# Patient Record
Sex: FEMALE | Race: OTHER | HISPANIC OR LATINO
[De-identification: names, ages, dates, MRNs, and addresses within clinical notes are randomized per-mention and may not be internally consistent; named-entity substitution may affect disease eponyms.]

---

## 2017-11-09 PROBLEM — Z00.00 ENCOUNTER FOR PREVENTIVE HEALTH EXAMINATION: Status: ACTIVE | Noted: 2017-11-09

## 2017-12-04 ENCOUNTER — APPOINTMENT (OUTPATIENT)
Dept: OBGYN | Facility: CLINIC | Age: 22
End: 2017-12-04
Payer: COMMERCIAL

## 2017-12-04 VITALS — WEIGHT: 293 LBS | BODY MASS INDEX: 50.02 KG/M2 | HEIGHT: 64 IN

## 2017-12-04 DIAGNOSIS — Z01.419 ENCOUNTER FOR GYNECOLOGICAL EXAMINATION (GENERAL) (ROUTINE) W/OUT ABNORMAL FINDINGS: ICD-10-CM

## 2017-12-04 PROCEDURE — 99385 PREV VISIT NEW AGE 18-39: CPT

## 2017-12-05 LAB
C TRACH RRNA SPEC QL NAA+PROBE: NOT DETECTED
FSH SERPL-MCNC: 8.4 IU/L
HCG SERPL-MCNC: <1 MIU/ML
LH SERPL-ACNC: 13.3 IU/L
N GONORRHOEA RRNA SPEC QL NAA+PROBE: NOT DETECTED
PROLACTIN SERPL-MCNC: 23 NG/ML
SOURCE TP AMPLIFICATION: NORMAL
TSH SERPL-ACNC: 7.2 UIU/ML

## 2017-12-08 LAB — CYTOLOGY CVX/VAG DOC THIN PREP: NORMAL

## 2017-12-14 ENCOUNTER — APPOINTMENT (OUTPATIENT)
Dept: ANTEPARTUM | Facility: CLINIC | Age: 22
End: 2017-12-14
Payer: COMMERCIAL

## 2017-12-14 ENCOUNTER — ASOB RESULT (OUTPATIENT)
Age: 22
End: 2017-12-14

## 2017-12-14 PROCEDURE — 76830 TRANSVAGINAL US NON-OB: CPT

## 2017-12-14 PROCEDURE — 76857 US EXAM PELVIC LIMITED: CPT

## 2018-02-01 ENCOUNTER — APPOINTMENT (OUTPATIENT)
Dept: GASTROENTEROLOGY | Facility: CLINIC | Age: 23
End: 2018-02-01
Payer: COMMERCIAL

## 2018-02-01 VITALS
WEIGHT: 293 LBS | DIASTOLIC BLOOD PRESSURE: 76 MMHG | RESPIRATION RATE: 18 BRPM | SYSTOLIC BLOOD PRESSURE: 111 MMHG | HEIGHT: 64 IN | BODY MASS INDEX: 50.02 KG/M2 | HEART RATE: 99 BPM

## 2018-02-01 DIAGNOSIS — Z86.39 PERSONAL HISTORY OF OTHER ENDOCRINE, NUTRITIONAL AND METABOLIC DISEASE: ICD-10-CM

## 2018-02-01 DIAGNOSIS — R73.03 PREDIABETES.: ICD-10-CM

## 2018-02-01 DIAGNOSIS — Z91.89 OTHER SPECIFIED PERSONAL RISK FACTORS, NOT ELSEWHERE CLASSIFIED: ICD-10-CM

## 2018-02-01 PROCEDURE — 99204 OFFICE O/P NEW MOD 45 MIN: CPT

## 2018-02-01 RX ORDER — METFORMIN ER 500 MG 500 MG/1
500 TABLET ORAL
Qty: 90 | Refills: 0 | Status: ACTIVE | COMMUNITY
Start: 2018-01-11

## 2018-02-01 RX ORDER — LEVOTHYROXINE SODIUM 0.05 MG/1
50 TABLET ORAL
Qty: 30 | Refills: 0 | Status: ACTIVE | COMMUNITY
Start: 2018-01-11

## 2018-02-27 ENCOUNTER — LABORATORY RESULT (OUTPATIENT)
Age: 23
End: 2018-02-27

## 2018-02-28 LAB
ALBUMIN SERPL ELPH-MCNC: 4.2 G/DL
ALP BLD-CCNC: 88 U/L
ALT SERPL-CCNC: 15 U/L
ANION GAP SERPL CALC-SCNC: 11 MMOL/L
AST SERPL-CCNC: 15 U/L
BASOPHILS # BLD AUTO: 0.02 K/UL
BASOPHILS NFR BLD AUTO: 0.2 %
BILIRUB SERPL-MCNC: 0.2 MG/DL
BUN SERPL-MCNC: 10 MG/DL
CALCIUM SERPL-MCNC: 9.3 MG/DL
CHLORIDE SERPL-SCNC: 102 MMOL/L
CO2 SERPL-SCNC: 26 MMOL/L
CREAT SERPL-MCNC: 0.74 MG/DL
EOSINOPHIL # BLD AUTO: 0 K/UL
EOSINOPHIL NFR BLD AUTO: 0 %
GLUCOSE SERPL-MCNC: 99 MG/DL
HCT VFR BLD CALC: 34 %
HGB BLD-MCNC: 10.7 G/DL
IMM GRANULOCYTES NFR BLD AUTO: 0.2 %
INR PPP: 1.16 RATIO
LYMPHOCYTES # BLD AUTO: 2.73 K/UL
LYMPHOCYTES NFR BLD AUTO: 29.1 %
MAN DIFF?: NORMAL
MCHC RBC-ENTMCNC: 24.9 PG
MCHC RBC-ENTMCNC: 31.5 GM/DL
MCV RBC AUTO: 79.3 FL
MONOCYTES # BLD AUTO: 0.76 K/UL
MONOCYTES NFR BLD AUTO: 8.1 %
NEUTROPHILS # BLD AUTO: 5.85 K/UL
NEUTROPHILS NFR BLD AUTO: 62.4 %
PLATELET # BLD AUTO: 297 K/UL
POTASSIUM SERPL-SCNC: 4.3 MMOL/L
PROT SERPL-MCNC: 7.4 G/DL
PT BLD: 13.2 SEC
RBC # BLD: 4.29 M/UL
RBC # FLD: 17.1 %
SODIUM SERPL-SCNC: 139 MMOL/L
TSH SERPL-ACNC: 4.29 UIU/ML
WBC # FLD AUTO: 9.38 K/UL

## 2018-03-07 ENCOUNTER — APPOINTMENT (OUTPATIENT)
Dept: GASTROENTEROLOGY | Facility: HOSPITAL | Age: 23
End: 2018-03-07

## 2018-03-07 ENCOUNTER — RESULT REVIEW (OUTPATIENT)
Age: 23
End: 2018-03-07

## 2018-03-07 ENCOUNTER — OUTPATIENT (OUTPATIENT)
Dept: OUTPATIENT SERVICES | Facility: HOSPITAL | Age: 23
LOS: 1 days | End: 2018-03-07
Payer: COMMERCIAL

## 2018-03-07 ENCOUNTER — TRANSCRIPTION ENCOUNTER (OUTPATIENT)
Age: 23
End: 2018-03-07

## 2018-03-07 DIAGNOSIS — E66.01 MORBID (SEVERE) OBESITY DUE TO EXCESS CALORIES: ICD-10-CM

## 2018-03-07 LAB
GLUCOSE BLDC GLUCOMTR-MCNC: 102 MG/DL — HIGH (ref 70–99)
HCG UR QL: NEGATIVE — SIGNIFICANT CHANGE UP

## 2018-03-07 PROCEDURE — 81025 URINE PREGNANCY TEST: CPT

## 2018-03-07 PROCEDURE — 43239 EGD BIOPSY SINGLE/MULTIPLE: CPT

## 2018-03-07 PROCEDURE — 88342 IMHCHEM/IMCYTCHM 1ST ANTB: CPT

## 2018-03-07 PROCEDURE — 88305 TISSUE EXAM BY PATHOLOGIST: CPT

## 2018-03-07 PROCEDURE — 88342 IMHCHEM/IMCYTCHM 1ST ANTB: CPT | Mod: 26

## 2018-03-07 PROCEDURE — 88305 TISSUE EXAM BY PATHOLOGIST: CPT | Mod: 26

## 2018-03-07 PROCEDURE — 82962 GLUCOSE BLOOD TEST: CPT

## 2019-01-17 ENCOUNTER — EMERGENCY (EMERGENCY)
Facility: HOSPITAL | Age: 24
LOS: 1 days | Discharge: HOME | End: 2019-01-17
Attending: STUDENT IN AN ORGANIZED HEALTH CARE EDUCATION/TRAINING PROGRAM
Payer: COMMERCIAL

## 2019-01-17 VITALS
SYSTOLIC BLOOD PRESSURE: 151 MMHG | HEIGHT: 64 IN | HEART RATE: 100 BPM | WEIGHT: 293 LBS | RESPIRATION RATE: 20 BRPM | OXYGEN SATURATION: 100 % | TEMPERATURE: 99 F | DIASTOLIC BLOOD PRESSURE: 91 MMHG

## 2019-01-17 LAB — HCG UR QL: NEGATIVE — SIGNIFICANT CHANGE UP

## 2019-01-17 PROCEDURE — 99283 EMERGENCY DEPT VISIT LOW MDM: CPT

## 2019-01-17 PROCEDURE — 81025 URINE PREGNANCY TEST: CPT

## 2019-01-17 RX ORDER — ONDANSETRON 8 MG/1
1 TABLET, FILM COATED ORAL
Qty: 9 | Refills: 0
Start: 2019-01-17 | End: 2019-01-19

## 2019-01-17 RX ORDER — ONDANSETRON 8 MG/1
8 TABLET, FILM COATED ORAL ONCE
Qty: 0 | Refills: 0 | Status: COMPLETED | OUTPATIENT
Start: 2019-01-17 | End: 2019-01-17

## 2019-01-17 RX ADMIN — ONDANSETRON 8 MILLIGRAM(S): 8 TABLET, FILM COATED ORAL at 21:28

## 2019-01-17 NOTE — ED STATDOCS - NS ED ROS FT
+ fever, no chills, No photophobia/eye pain/changes in vision, No ear pain, + sore throat, no dysphagia, No chest pain/palpitations, no SOB/cough/wheeze/stridor, + abdominal pain, + N/V/D, no dysuria/frequency/discharge, No neck/back pain, no rash, no changes in neurological status/function. + fever, no chills, No photophobia/eye pain/changes in vision, No ear pain, + sore throat, no dysphagia, No chest pain/palpitations, + cough, no SOB/wheeze/stridor, + abdominal pain, + N/V/D, no dysuria/frequency/discharge, No neck/back pain, no rash, no changes in neurological status/function.

## 2019-01-17 NOTE — ED STATDOCS - PROGRESS NOTE DETAILS
Pa note: Pt with improvement I symptoms s/p treatment, no episodes of vomiting in ED. Pt tolerating PO intake. Urine preg negative. Likely viral syndrome. Will d/c with short course of PO zofran

## 2019-01-17 NOTE — ED STATDOCS - OBJECTIVE STATEMENT
22 y/o F pt presents to the ED c/o N/V/D and abdominal pain today.  Pt had episodes of diarrhea beginning 7 days ago, which resolved on its own several days later.  Pt began to have a sore throat and subjective fever 2 days ago, saw her PMD yesterday, was told she had a viral infection and told to take ibuprofen.  Today she began to have episodes of diarrhea and vomiting today, with associated abdominal pain.  Pt ate one meal today and vomited afterwards, drank a small amount of ginger ale, but no other fluids today.  Denies CP, SOB, urinary symptoms, back pain.  No further acute complaints at this time. 24 y/o F pt presents to the ED c/o N/V/D and abdominal pain today.  Pt had episodes of diarrhea beginning 7 days ago, which resolved on its own several days later.  Pt began to have a sore throat, cough, and subjective fever 2 days ago, saw her PMD yesterday, was told she had a viral infection and told to take ibuprofen.  Today she began to have episodes of diarrhea and vomiting today, and noted abdominal pain after episodes of coughing.  Pt ate one meal today and vomited afterwards, drank a small amount of ginger ale, but no other fluids today.  Denies CP, SOB, urinary symptoms, back pain.  No further acute complaints at this time.

## 2019-05-20 ENCOUNTER — APPOINTMENT (OUTPATIENT)
Dept: PULMONOLOGY | Facility: CLINIC | Age: 24
End: 2019-05-20

## 2019-07-25 ENCOUNTER — APPOINTMENT (OUTPATIENT)
Dept: PULMONOLOGY | Facility: CLINIC | Age: 24
End: 2019-07-25

## 2019-08-15 ENCOUNTER — APPOINTMENT (OUTPATIENT)
Dept: PULMONOLOGY | Facility: CLINIC | Age: 24
End: 2019-08-15

## 2019-09-02 ENCOUNTER — TRANSCRIPTION ENCOUNTER (OUTPATIENT)
Age: 24
End: 2019-09-02

## 2019-09-03 ENCOUNTER — APPOINTMENT (OUTPATIENT)
Dept: PULMONOLOGY | Facility: CLINIC | Age: 24
End: 2019-09-03
Payer: MEDICAID

## 2019-09-03 VITALS — BODY MASS INDEX: 51.27 KG/M2 | WEIGHT: 293 LBS | HEIGHT: 63.5 IN

## 2019-09-03 VITALS
HEART RATE: 83 BPM | SYSTOLIC BLOOD PRESSURE: 138 MMHG | DIASTOLIC BLOOD PRESSURE: 82 MMHG | OXYGEN SATURATION: 99 % | RESPIRATION RATE: 16 BRPM

## 2019-09-03 PROCEDURE — 94010 BREATHING CAPACITY TEST: CPT

## 2019-09-03 PROCEDURE — 85018 HEMOGLOBIN: CPT | Mod: QW

## 2019-09-03 PROCEDURE — 94729 DIFFUSING CAPACITY: CPT

## 2019-09-03 PROCEDURE — 94727 GAS DIL/WSHOT DETER LNG VOL: CPT

## 2019-09-03 PROCEDURE — 99204 OFFICE O/P NEW MOD 45 MIN: CPT | Mod: 25

## 2019-09-03 RX ORDER — CYCLOBENZAPRINE HYDROCHLORIDE 10 MG/1
10 TABLET, FILM COATED ORAL
Qty: 30 | Refills: 0 | Status: DISCONTINUED | COMMUNITY
Start: 2017-12-27 | End: 2019-09-03

## 2019-09-03 NOTE — REVIEW OF SYSTEMS
[Dyspnea] : dyspnea [Hypertension] : ~T hypertension [Diabetes] : diabetes mellitus [Negative] : Sleep Disorder

## 2019-09-03 NOTE — CONSULT LETTER
[Dear  ___] : Dear  [unfilled], [Consult Closing:] : Thank you very much for allowing me to participate in the care of this patient.  If you have any questions, please do not hesitate to contact me. [Please see my note below.] : Please see my note below. [Consult Letter:] : I had the pleasure of evaluating your patient, [unfilled]. [Sincerely,] : Sincerely, [FreeTextEntry3] : Antonio Hess MD\par

## 2019-09-03 NOTE — REASON FOR VISIT
[Preoperative Evaluation] : an preoperative evaluation [Shortness of Breath] : shortness of Breath [Sleep Apnea] : sleep apnea

## 2019-09-03 NOTE — PHYSICAL EXAM
[General Appearance - Well Developed] : well developed [Normal Appearance] : normal appearance [Well Groomed] : well groomed [General Appearance - Well Nourished] : well nourished [General Appearance - In No Acute Distress] : no acute distress [No Deformities] : no deformities [Eyelids - No Xanthelasma] : the eyelids demonstrated no xanthelasmas [Normal Conjunctiva] : the conjunctiva exhibited no abnormalities [Normal Oropharynx] : normal oropharynx [Neck Appearance] : the appearance of the neck was normal [II] : II [Neck Cervical Mass (___cm)] : no neck mass was observed [Jugular Venous Distention Increased] : there was no jugular-venous distention [Thyroid Diffuse Enlargement] : the thyroid was not enlarged [Thyroid Nodule] : there were no palpable thyroid nodules [Heart Rate And Rhythm] : heart rate and rhythm were normal [Heart Sounds] : normal S1 and S2 [Murmurs] : no murmurs present [Respiration, Rhythm And Depth] : normal respiratory rhythm and effort [Exaggerated Use Of Accessory Muscles For Inspiration] : no accessory muscle use [Auscultation Breath Sounds / Voice Sounds] : lungs were clear to auscultation bilaterally [Abdomen Soft] : soft [Abdomen Tenderness] : non-tender [Abdomen Mass (___ Cm)] : no abdominal mass palpated [Gait - Sufficient For Exercise Testing] : the gait was sufficient for exercise testing [Abnormal Walk] : normal gait [Cyanosis, Localized] : no localized cyanosis [Nail Clubbing] : no clubbing of the fingernails [Petechial Hemorrhages (___cm)] : no petechial hemorrhages [] : no ischemic changes [Skin Turgor] : normal skin turgor [No Focal Deficits] : no focal deficits [Oriented To Time, Place, And Person] : oriented to person, place, and time [Impaired Insight] : insight and judgment were intact [Affect] : the affect was normal

## 2019-09-19 ENCOUNTER — OUTPATIENT (OUTPATIENT)
Dept: OUTPATIENT SERVICES | Facility: HOSPITAL | Age: 24
LOS: 1 days | End: 2019-09-19
Payer: COMMERCIAL

## 2019-09-19 DIAGNOSIS — G47.33 OBSTRUCTIVE SLEEP APNEA (ADULT) (PEDIATRIC): ICD-10-CM

## 2019-09-19 PROCEDURE — 95806 SLEEP STUDY UNATT&RESP EFFT: CPT | Mod: 26

## 2019-09-19 PROCEDURE — 95806 SLEEP STUDY UNATT&RESP EFFT: CPT

## 2019-09-19 PROCEDURE — G0399: CPT

## 2019-12-31 ENCOUNTER — EMERGENCY (EMERGENCY)
Facility: HOSPITAL | Age: 24
LOS: 1 days | Discharge: HOME | End: 2019-12-31
Attending: EMERGENCY MEDICINE
Payer: COMMERCIAL

## 2019-12-31 VITALS
SYSTOLIC BLOOD PRESSURE: 123 MMHG | HEART RATE: 98 BPM | DIASTOLIC BLOOD PRESSURE: 71 MMHG | HEIGHT: 64 IN | OXYGEN SATURATION: 100 % | RESPIRATION RATE: 18 BRPM | TEMPERATURE: 98 F | WEIGHT: 293 LBS

## 2019-12-31 PROCEDURE — 99284 EMERGENCY DEPT VISIT MOD MDM: CPT

## 2019-12-31 PROCEDURE — 99283 EMERGENCY DEPT VISIT LOW MDM: CPT

## 2019-12-31 PROCEDURE — 93005 ELECTROCARDIOGRAM TRACING: CPT

## 2019-12-31 PROCEDURE — 93010 ELECTROCARDIOGRAM REPORT: CPT

## 2019-12-31 NOTE — ED STATDOCS - PATIENT PORTAL LINK FT
You can access the FollowMyHealth Patient Portal offered by [facility] by registering at the following website: [email or web address]. By joining [employer]’s FollowMyHealth portal, you will also be able to view your health information using other applications (apps) compatible with our system.

## 2019-12-31 NOTE — ED STATDOCS - OBJECTIVE STATEMENT
25 y/o F with a significant PMHx of DM, hypothyroidism, anemia, and HTN presents to the ED c/o heavy palpitations onset 1.5 hours PTA. Pt notes that she was sitting at a hair salon when she began to experience palpitations that have not resolved since; she notes that the palpitations last for about 5 minutes then go away but will return shortly after. Pt states the palpitations feel as though "her heart is going to pound out of her chest." Pt denies any associated LE edema, CP, SOB or n/v/d. Pt denies any notable cardiac history. Pt currently takes numerous HTN medications(Enalapril, amlodipine, and clonidine) as well as metformin and Levothyroxine. NKDA. No further complaints at this time.  Pt has a bariatric surgery scheduled on 01/28

## 2019-12-31 NOTE — ED STATDOCS - NS ED ATTENDING STATEMENT MOD
s/p Right breast revision of reconstruction with exchange of implants, placement of alloderm
Attending Only

## 2019-12-31 NOTE — ED ADULT TRIAGE NOTE - CHIEF COMPLAINT QUOTE
I was at the nail salon and started feeling palpitations, hx of hypothyroidism, DM, anemia and HTN. denies cardiac hx.

## 2019-12-31 NOTE — ED STATDOCS - CLINICAL SUMMARY MEDICAL DECISION MAKING FREE TEXT BOX
Pt is safe for DC with reassurance. asymtptomatic now with normal vitals and NSR on ECG; has had thorough med eval recently due to upcoming bariatric surgery; ok for d/c with reassurance and precautions for return

## 2020-01-02 ENCOUNTER — APPOINTMENT (OUTPATIENT)
Dept: PULMONOLOGY | Facility: CLINIC | Age: 25
End: 2020-01-02
Payer: MEDICAID

## 2020-01-02 VITALS
SYSTOLIC BLOOD PRESSURE: 118 MMHG | HEIGHT: 61 IN | OXYGEN SATURATION: 100 % | DIASTOLIC BLOOD PRESSURE: 66 MMHG | HEART RATE: 106 BPM | WEIGHT: 293 LBS | BODY MASS INDEX: 55.32 KG/M2

## 2020-01-02 PROCEDURE — 99215 OFFICE O/P EST HI 40 MIN: CPT

## 2020-01-02 RX ORDER — CLONIDINE HYDROCHLORIDE 0.2 MG/1
0.2 TABLET ORAL
Qty: 60 | Refills: 0 | Status: DISCONTINUED | COMMUNITY
Start: 2019-11-13

## 2020-01-02 NOTE — PROCEDURE
[FreeTextEntry1] : PFTs 9/3/19 - Mild restriction on spirometry with normal lung volumes and moderately reduced diffusion capacity which corrected for alveolar volume.\par

## 2020-01-02 NOTE — REVIEW OF SYSTEMS
[Hypertension] : ~T hypertension [Anemia] : anemia [As Noted in HPI] : as noted in HPI [Thyroid Problem] : thyroid problem [Fever] : no fever [Dry Eyes] : no dryness of the eyes [Chills] : no chills [Eye Irritation] : no ~T irritation of the eyes [Nasal Congestion] : no nasal congestion [Epistaxis] : no nosebleeds [Postnasal Drip] : no postnasal drip [Sinus Problems] : no sinus problems [Sputum] : not coughing up ~M sputum [Cough] : no cough [Chest Tightness] : no chest tightness [Dyspnea] : no dyspnea [Pleuritic Pain] : no pleuritic pain [Wheezing] : no wheezing [Dysrhythmia] : no dysrhythmia [Chest Discomfort] : no chest discomfort [Palpitations] : no palpitations [Murmurs] : no murmurs were heard [Edema] : ~T edema was not present [Hay Fever] : no hay fever [Itchy Eyes] : no itching of ~T the eyes [Reflux] : no reflux [Nausea] : no nausea [Vomiting] : no vomiting [Constipation] : no constipation [Diarrhea] : no diarrhea [Abdominal Pain] : no abdominal pain [Dysuria] : no dysuria [Trauma] : no ~T physical trauma [Fracture] : no fracture [Dizziness] : no dizziness [Headache] : no headache [Syncope] : no fainting [Numbness] : no numbness [Paralysis] : no paralysis was seen [Seizures] : no seizures [Anxiety] : no anxiety [Depression] : no depression [Diabetes] : no diabetes mellitus

## 2020-01-02 NOTE — DISCUSSION/SUMMARY
[FreeTextEntry1] : \par #1. PFTs performed previously revealed mild restriction on spirometry but with near normal lung volumes and moderately reduced diffusion capacity which corrected for alveolar volume.\par #2. The patient does not appear to require chronic BD therapy at this time\par #3. SOBOE is likely related to weight or deconditioning given restriction\par #4. Diet and exercise for weight loss\par #5. Pulmonary clearance and recommendations per Dr. Hess's notes\par #6. Start autoCPAP therapy for moderate to severe ANNAMARIA\par #7. F/u in 3 weeks to evaluate response with compliance\par #8. Further pre-op sleep recommendations will be forthcoming pending the above w/u

## 2020-01-02 NOTE — REASON FOR VISIT
[Shortness of Breath] : shortness of Breath [Sleep Apnea] : sleep apnea [Initial Evaluation] : an initial evaluation [FreeTextEntry2] : MO, clearance

## 2020-01-02 NOTE — CONSULT LETTER
[Dear  ___] : Dear  [unfilled], [Consult Letter:] : I had the pleasure of evaluating your patient, [unfilled]. [Consult Closing:] : Thank you very much for allowing me to participate in the care of this patient.  If you have any questions, please do not hesitate to contact me. [Please see my note below.] : Please see my note below. [DrYana  ___] : Dr. WALKER [Sincerely,] : Sincerely, [FreeTextEntry3] : Rajeev Pandya MD, FCCP, D. ABSM\par Pulmonary and Sleep Medicine\par [facility] [place]

## 2020-01-02 NOTE — HISTORY OF PRESENT ILLNESS
[Initial Evaluation] : an initial evaluation of [Excessive Daytime Sleepiness] : excessive daytime sleepiness [Snoring] : snoring [Unrefreshing Sleep] : unrefreshing sleep [Sleepy When Sedentary] : sleepy when sedentary [None] : The patient is not currently being treated for this problem [Follow-Up - Routine Clinic] : a routine clinic follow-up of [Excess Weight] : excess weight [Currently Experiencing] : The patient is currently experiencing symptoms. [Dyspnea] : dyspnea [Low Calorie Diet] : low calorie diet [Knee Pain] : knee pain [Fair Compliance] : fair compliance with treatment [Fair Tolerance] : fair tolerance of treatment [Poor Symptom Control] : poor symptom control [Hypertension] : hypertension [Sleep Apnea] : sleep apnea [High] : high [Low Calorie] : low calorie [Well Balanced Diet] : well balanced meals [___ Times/Week] : exercises [unfilled] times per week [Aerobic Conditioning] : aerobic conditioning [FreeTextEntry1] : Patient c/o SOBOE but is otherwise without associated respiratory complaints.\par  [Witnessed Apnea During Sleep] : no witnessed apnea during sleep [Witnessed Gasping During Sleep] : no witnessed gasping during sleep

## 2020-01-26 ENCOUNTER — TRANSCRIPTION ENCOUNTER (OUTPATIENT)
Age: 25
End: 2020-01-26

## 2020-01-27 ENCOUNTER — APPOINTMENT (OUTPATIENT)
Dept: PULMONOLOGY | Facility: CLINIC | Age: 25
End: 2020-01-27

## 2020-02-24 ENCOUNTER — APPOINTMENT (OUTPATIENT)
Dept: OBGYN | Facility: CLINIC | Age: 25
End: 2020-02-24
Payer: MEDICAID

## 2020-02-24 VITALS — DIASTOLIC BLOOD PRESSURE: 70 MMHG | SYSTOLIC BLOOD PRESSURE: 124 MMHG

## 2020-02-24 VITALS — BODY MASS INDEX: 59.15 KG/M2 | WEIGHT: 293 LBS

## 2020-02-24 PROCEDURE — 36415 COLL VENOUS BLD VENIPUNCTURE: CPT

## 2020-02-24 PROCEDURE — 99395 PREV VISIT EST AGE 18-39: CPT

## 2020-02-24 NOTE — PHYSICAL EXAM
[Awake] : awake [Mass] : no breast mass [Acute Distress] : no acute distress [Alert] : alert [Axillary LAD] : no axillary lymphadenopathy [Nipple Discharge] : no nipple discharge [Soft] : soft [Tender] : non tender [Oriented x3] : oriented to person, place, and time [Normal] : cervix [No Bleeding] : there was no active vaginal bleeding [Uterine Adnexae] : were not tender and not enlarged

## 2020-02-25 LAB
BASOPHILS # BLD AUTO: 0.05 K/UL
BASOPHILS NFR BLD AUTO: 0.5 %
EOSINOPHIL # BLD AUTO: 0 K/UL
EOSINOPHIL NFR BLD AUTO: 0 %
ESTIMATED AVERAGE GLUCOSE: 111 MG/DL
FSH SERPL-MCNC: 8.9 IU/L
HBA1C MFR BLD HPLC: 5.5 %
HCT VFR BLD CALC: 41.2 %
HGB BLD-MCNC: 13 G/DL
IMM GRANULOCYTES NFR BLD AUTO: 0.2 %
LH SERPL-ACNC: 13.5 IU/L
LYMPHOCYTES # BLD AUTO: 2.27 K/UL
LYMPHOCYTES NFR BLD AUTO: 24.9 %
MAN DIFF?: NORMAL
MCHC RBC-ENTMCNC: 26.9 PG
MCHC RBC-ENTMCNC: 31.6 GM/DL
MCV RBC AUTO: 85.3 FL
MONOCYTES # BLD AUTO: 0.98 K/UL
MONOCYTES NFR BLD AUTO: 10.8 %
NEUTROPHILS # BLD AUTO: 5.78 K/UL
NEUTROPHILS NFR BLD AUTO: 63.6 %
PLATELET # BLD AUTO: 286 K/UL
PROLACTIN SERPL-MCNC: 15.4 NG/ML
RBC # BLD: 4.83 M/UL
RBC # FLD: 15.2 %
T3FREE SERPL-MCNC: 2.57 PG/ML
T4 FREE SERPL-MCNC: 1.8 NG/DL
TESTOST SERPL-MCNC: 28.7 NG/DL
TSH SERPL-ACNC: 2.77 UIU/ML
WBC # FLD AUTO: 9.1 K/UL

## 2020-02-26 LAB
C TRACH RRNA SPEC QL NAA+PROBE: NOT DETECTED
CYTOLOGY CVX/VAG DOC THIN PREP: ABNORMAL
HPV HIGH+LOW RISK DNA PNL CVX: NOT DETECTED
N GONORRHOEA RRNA SPEC QL NAA+PROBE: NOT DETECTED
SOURCE TP AMPLIFICATION: NORMAL

## 2020-03-05 ENCOUNTER — APPOINTMENT (OUTPATIENT)
Dept: ANTEPARTUM | Facility: CLINIC | Age: 25
End: 2020-03-05
Payer: MEDICAID

## 2020-03-05 ENCOUNTER — ASOB RESULT (OUTPATIENT)
Age: 25
End: 2020-03-05

## 2020-03-05 PROCEDURE — 76830 TRANSVAGINAL US NON-OB: CPT

## 2020-03-05 PROCEDURE — 76856 US EXAM PELVIC COMPLETE: CPT | Mod: 59

## 2020-03-31 ENCOUNTER — APPOINTMENT (OUTPATIENT)
Dept: OBGYN | Facility: CLINIC | Age: 25
End: 2020-03-31
Payer: MEDICAID

## 2020-03-31 VITALS
SYSTOLIC BLOOD PRESSURE: 120 MMHG | DIASTOLIC BLOOD PRESSURE: 81 MMHG | WEIGHT: 293 LBS | BODY MASS INDEX: 55.32 KG/M2 | HEIGHT: 61 IN

## 2020-03-31 PROCEDURE — 58558Z: CUSTOM

## 2020-03-31 NOTE — PROCEDURE
[Endometrial Biopsy] : Endometrial biopsy [Irregular Bleeding] : irregular uterine bleeding [Risks] : risks [Benefits] : benefits [Alternatives] : alternatives [Patient] : patient [Infection] : infection [Bleeding] : bleeding [Allergic Reaction] : allergic reaction [Uterine Perforation] : uterine perforation [Pain] : pain [CONSENT OBTAINED] : written consent was obtained prior to the procedure. [LMP ___] : LMP was [unfilled] [Neg Pregnancy Test] : a pregnancy test was negative [Betadine] : Betadine [Paracervical Block] : A paracervical block was performed using [1%] : 1% [Without Epi] : without epinephrine [Tenaculum] : a single toothed tenaculum [Sounded to ___ cm] : sounded to [unfilled] ~Ucm [Anteverted] : anteverted [Pipelle] : a Pipelle endometrial suction curette [Moderate] : a moderate [Sent to Histology] : the specimen was place in buffered formalin and sent for pathlogy [Tolerated Well] : the patient tolerated the procedure well [No Complications] : there were no complications

## 2020-04-01 LAB
HCG UR QL: NEGATIVE
QUALITY CONTROL: YES

## 2020-04-06 LAB — CORE LAB BIOPSY: NORMAL

## 2020-04-15 ENCOUNTER — APPOINTMENT (OUTPATIENT)
Dept: PULMONOLOGY | Facility: CLINIC | Age: 25
End: 2020-04-15
Payer: MEDICAID

## 2020-04-15 PROCEDURE — 99213 OFFICE O/P EST LOW 20 MIN: CPT | Mod: 95

## 2020-04-15 RX ORDER — OXYCODONE AND ACETAMINOPHEN 5; 325 MG/1; MG/1
5-325 TABLET ORAL
Qty: 10 | Refills: 0 | Status: DISCONTINUED | COMMUNITY
Start: 2020-02-08 | End: 2020-04-15

## 2020-04-15 RX ORDER — CLONIDINE HYDROCHLORIDE 0.1 MG/1
0.1 TABLET ORAL
Qty: 60 | Refills: 0 | Status: DISCONTINUED | COMMUNITY
Start: 2019-09-09 | End: 2020-04-15

## 2020-04-15 RX ORDER — FERROUS SULFATE TAB 325 MG (65 MG ELEMENTAL FE) 325 (65 FE) MG
325 (65 FE) TAB ORAL
Qty: 60 | Refills: 0 | Status: ACTIVE | COMMUNITY
Start: 2020-01-18

## 2020-04-15 RX ORDER — AZITHROMYCIN 500 MG/1
500 TABLET, FILM COATED ORAL
Qty: 3 | Refills: 0 | Status: DISCONTINUED | COMMUNITY
Start: 2020-01-28 | End: 2020-04-15

## 2020-04-15 RX ORDER — PREDNISONE 20 MG/1
20 TABLET ORAL
Qty: 10 | Refills: 0 | Status: DISCONTINUED | COMMUNITY
Start: 2020-01-28 | End: 2020-04-15

## 2020-04-15 RX ORDER — AMLODIPINE BESYLATE 5 MG/1
5 TABLET ORAL
Qty: 30 | Refills: 0 | Status: DISCONTINUED | COMMUNITY
Start: 2019-07-15 | End: 2020-04-15

## 2020-04-15 RX ORDER — GABAPENTIN 300 MG/1
300 CAPSULE ORAL
Qty: 30 | Refills: 0 | Status: DISCONTINUED | COMMUNITY
Start: 2020-02-24 | End: 2020-04-15

## 2020-04-15 RX ORDER — FAMOTIDINE 40 MG/1
40 TABLET, FILM COATED ORAL
Qty: 30 | Refills: 0 | Status: DISCONTINUED | COMMUNITY
Start: 2019-11-13 | End: 2020-04-15

## 2020-04-15 RX ORDER — BROMPHENIRAMINE MALEATE, PSEUDOEPHEDRINE HYDROCHLORIDE, 2; 30; 10 MG/5ML; MG/5ML; MG/5ML
30-2-10 SYRUP ORAL
Qty: 120 | Refills: 0 | Status: DISCONTINUED | COMMUNITY
Start: 2020-01-28 | End: 2020-04-15

## 2020-04-15 RX ORDER — VALACYCLOVIR 1 G/1
1 TABLET, FILM COATED ORAL
Qty: 30 | Refills: 0 | Status: DISCONTINUED | COMMUNITY
Start: 2020-02-24 | End: 2020-04-15

## 2020-04-15 RX ORDER — AMLODIPINE BESYLATE 2.5 MG/1
2.5 TABLET ORAL
Qty: 30 | Refills: 0 | Status: DISCONTINUED | COMMUNITY
Start: 2019-03-04 | End: 2020-04-15

## 2020-04-15 RX ORDER — OSELTAMIVIR PHOSPHATE 75 MG/1
75 CAPSULE ORAL
Qty: 10 | Refills: 0 | Status: DISCONTINUED | COMMUNITY
Start: 2020-01-13 | End: 2020-04-15

## 2020-04-15 RX ORDER — ONDANSETRON 4 MG/1
4 TABLET ORAL
Qty: 9 | Refills: 0 | Status: DISCONTINUED | COMMUNITY
Start: 2020-02-08 | End: 2020-04-15

## 2020-04-20 NOTE — REVIEW OF SYSTEMS
[Hypertension] : ~T hypertension [Anemia] : anemia [Thyroid Problem] : thyroid problem [As Noted in HPI] : as noted in HPI [Fever] : no fever [Chills] : no chills [Dry Eyes] : no dryness of the eyes [Eye Irritation] : no ~T irritation of the eyes [Nasal Congestion] : no nasal congestion [Epistaxis] : no nosebleeds [Postnasal Drip] : no postnasal drip [Sinus Problems] : no sinus problems [Cough] : no cough [Sputum] : not coughing up ~M sputum [Dyspnea] : no dyspnea [Chest Tightness] : no chest tightness [Pleuritic Pain] : no pleuritic pain [Wheezing] : no wheezing [Chest Discomfort] : no chest discomfort [Dysrhythmia] : no dysrhythmia [Murmurs] : no murmurs were heard [Palpitations] : no palpitations [Edema] : ~T edema was not present [Hay Fever] : no hay fever [Itchy Eyes] : no itching of ~T the eyes [Reflux] : no reflux [Nausea] : no nausea [Vomiting] : no vomiting [Constipation] : no constipation [Diarrhea] : no diarrhea [Abdominal Pain] : no abdominal pain [Dysuria] : no dysuria [Trauma] : no ~T physical trauma [Fracture] : no fracture [Headache] : no headache [Dizziness] : no dizziness [Syncope] : no fainting [Numbness] : no numbness [Paralysis] : no paralysis was seen [Seizures] : no seizures [Depression] : no depression [Anxiety] : no anxiety [Diabetes] : no diabetes mellitus

## 2020-04-20 NOTE — CONSULT LETTER
[Dear  ___] : Dear  [unfilled], [Consult Letter:] : I had the pleasure of evaluating your patient, [unfilled]. [Please see my note below.] : Please see my note below. [Consult Closing:] : Thank you very much for allowing me to participate in the care of this patient.  If you have any questions, please do not hesitate to contact me. [Sincerely,] : Sincerely, [DrYana  ___] : Dr. WALKER [FreeTextEntry3] : Rajeev Pandya MD, FCCP, D. ABSM\par Pulmonary and Sleep Medicine\par [facility] [place]

## 2020-04-20 NOTE — HISTORY OF PRESENT ILLNESS
[Initial Evaluation] : an initial evaluation of [None] : No associated symptoms are reported [Good Compliance] : good compliance with treatment [Good Tolerance] : good tolerance of treatment [Good Symptom Control] : good symptom control [Follow-Up - Routine Clinic] : a routine clinic follow-up of [Excess Weight] : excess weight [Currently Experiencing] : The patient is currently experiencing symptoms. [Dyspnea] : dyspnea [Knee Pain] : knee pain [Low Calorie Diet] : low calorie diet [Fair Compliance] : fair compliance with treatment [Fair Tolerance] : fair tolerance of treatment [Poor Symptom Control] : poor symptom control [Sleep Apnea] : sleep apnea [Hypertension] : hypertension [High] : high [Low Calorie] : low calorie [Well Balanced Diet] : well balanced meals [___ Times/Week] : exercises [unfilled] times per week [Aerobic Conditioning] : aerobic conditioning [Home] : at home, [unfilled] , at the time of the visit. [Medical Office: ([facility])___] : at the medical office located in  [Patient] : the patient [Self] : self [FreeTextEntry1] : Patient c/o SOBOE but is otherwise without associated respiratory complaints.\par  [de-identified] : AutoCPAP

## 2020-04-20 NOTE — DISCUSSION/SUMMARY
[FreeTextEntry1] : \par #1. PFTs performed previously revealed mild restriction on spirometry but with near normal lung volumes and moderately reduced diffusion capacity which corrected for alveolar volume.\par #2. The patient does not appear to require chronic BD therapy at this time\par #3. SOBOE is likely related to weight or deconditioning given restriction\par #4. Diet and exercise for weight loss\par #5. She has lost 60 lbs since her surgery\par #6. Continue autoCPAP therapy for moderate to severe ANNAMARIA\par #7. F/u in 3-4 months to evaluate response with compliance

## 2020-04-20 NOTE — REASON FOR VISIT
[Shortness of Breath] : shortness of Breath [Sleep Apnea] : sleep apnea [Follow-Up] : a follow-up visit [FreeTextEntry2] : MO

## 2020-04-29 ENCOUNTER — APPOINTMENT (OUTPATIENT)
Dept: OBGYN | Facility: CLINIC | Age: 25
End: 2020-04-29
Payer: MEDICAID

## 2020-04-29 VITALS
SYSTOLIC BLOOD PRESSURE: 114 MMHG | BODY MASS INDEX: 54.56 KG/M2 | WEIGHT: 289 LBS | DIASTOLIC BLOOD PRESSURE: 73 MMHG | HEART RATE: 92 BPM | HEIGHT: 61 IN

## 2020-04-29 PROCEDURE — 99213 OFFICE O/P EST LOW 20 MIN: CPT

## 2020-04-29 NOTE — COUNSELING
[Breast Self Exam] : breast self exam [Nutrition] : nutrition [STD (testing, results, tx)] : STD (testing, results, tx) [Exercise] : exercise [Vitamins/Supplements] : vitamins/supplements [Safe Sexual Practices] : safe sexual practices

## 2020-04-29 NOTE — CHIEF COMPLAINT
[Follow Up] : follow up GYN visit [FreeTextEntry1] : irregular menstrual cycle, hirsutism, and obesity.

## 2020-08-17 ENCOUNTER — APPOINTMENT (OUTPATIENT)
Dept: PULMONOLOGY | Facility: CLINIC | Age: 25
End: 2020-08-17
Payer: MEDICAID

## 2020-08-17 VITALS
WEIGHT: 240 LBS | OXYGEN SATURATION: 98 % | DIASTOLIC BLOOD PRESSURE: 70 MMHG | HEIGHT: 63.5 IN | HEART RATE: 73 BPM | BODY MASS INDEX: 42 KG/M2 | SYSTOLIC BLOOD PRESSURE: 118 MMHG

## 2020-08-17 PROCEDURE — 99214 OFFICE O/P EST MOD 30 MIN: CPT

## 2020-08-17 RX ORDER — GENTAMICIN SULFATE 1 MG/G
0.1 OINTMENT TOPICAL
Qty: 15 | Refills: 0 | Status: DISCONTINUED | COMMUNITY
Start: 2020-02-24 | End: 2020-08-17

## 2020-08-17 RX ORDER — MEDROXYPROGESTERONE ACETATE 10 MG/1
10 TABLET ORAL
Qty: 30 | Refills: 3 | Status: DISCONTINUED | COMMUNITY
Start: 2020-03-31 | End: 2020-08-17

## 2020-08-17 RX ORDER — OFLOXACIN 3 MG/ML
0.3 SOLUTION/ DROPS OPHTHALMIC
Qty: 5 | Refills: 0 | Status: DISCONTINUED | COMMUNITY
Start: 2020-01-13 | End: 2020-08-17

## 2020-08-17 RX ORDER — OMEPRAZOLE 40 MG/1
40 CAPSULE, DELAYED RELEASE ORAL
Refills: 0 | Status: ACTIVE | COMMUNITY

## 2020-08-17 RX ORDER — CHLORTHALIDONE 25 MG/1
25 TABLET ORAL
Qty: 30 | Refills: 0 | Status: DISCONTINUED | COMMUNITY
Start: 2019-07-17 | End: 2020-08-17

## 2020-08-17 RX ORDER — DESOGESTREL AND ETHINYL ESTRADIOL 0.15-0.03
0.15-3 KIT ORAL DAILY
Qty: 3 | Refills: 2 | Status: DISCONTINUED | COMMUNITY
Start: 2020-04-29 | End: 2020-08-17

## 2020-08-17 NOTE — DISCUSSION/SUMMARY
[FreeTextEntry1] : \par #1. PFTs performed previously revealed mild restriction on spirometry but with near normal lung volumes and moderately reduced diffusion capacity which corrected for alveolar volume.\par #2. The patient does not appear to require chronic BD therapy at this time\par #3. SOBOE is likely related to weight or deconditioning given restriction\par #4. Diet and exercise for weight loss\par #5. She has lost 120 lbs since her surgery\par #6. Continue autoCPAP therapy for moderate to severe ANNAMARIA for now but will obtain HST to evaluate for resolution of ANNAMARIA with weight loss and possible d/c of PAP therapy as pt does not feel she needs it any longer and feels she is sleeping well without therapy\par #7. F/u after HST and with PFTs to evaluate for resolution of restriction with weight loss\par #8. Reviewed risks of exposure and symptoms of Covid-19 virus, including how the virus is spread.\par \par Discussed the following risk-reducing strategies:\par -Wash hands with soap and water (proper technique reviewed) \par -Use alcohol based  when hand-washing is not an option\par -Maintain a social distance of at least 6 feet whenever possible\par -Avoid contact, especially hand shaking\par -Avoid touching eyes, nose, and mouth\par -Cover face/mouth when coughing or sneezing\par -Avoid close contact with sick people\par -Seek medical help if you develop a fever and/or respiratory symptoms (e.g. cough, chest pain, SOB)\par \par Patient's questions were answered and patient appears to understand these recommendations

## 2020-08-17 NOTE — CONSULT LETTER
[Dear  ___] : Dear  [unfilled], [Consult Letter:] : I had the pleasure of evaluating your patient, [unfilled]. [Consult Closing:] : Thank you very much for allowing me to participate in the care of this patient.  If you have any questions, please do not hesitate to contact me. [Please see my note below.] : Please see my note below. [Sincerely,] : Sincerely, [DrYana  ___] : Dr. WALKER [FreeTextEntry3] : Rajeev Pandya MD, FCCP, D. ABSM\par Pulmonary and Sleep Medicine\par [facility] at [place]

## 2020-08-17 NOTE — REASON FOR VISIT
[Follow-Up] : a follow-up visit [Shortness of Breath] : shortness of Breath [Sleep Apnea] : sleep apnea [FreeTextEntry2] : MO

## 2020-08-17 NOTE — PHYSICAL EXAM
[Normal Appearance] : normal appearance [General Appearance - Well Developed] : well developed [General Appearance - In No Acute Distress] : no acute distress [Normal Conjunctiva] : the conjunctiva exhibited no abnormalities [Low Lying Soft Palate] : low lying soft palate [II] : II [Enlarged Base of the Tongue] : enlargement of the base of the tongue [Neck Appearance] : the appearance of the neck was normal [Heart Rate And Rhythm] : heart rate and rhythm were normal [Murmurs] : no murmurs present [Heart Sounds] : normal S1 and S2 [Edema] : no peripheral edema present [] : no respiratory distress [Auscultation Breath Sounds / Voice Sounds] : lungs were clear to auscultation bilaterally [Respiration, Rhythm And Depth] : normal respiratory rhythm and effort [Exaggerated Use Of Accessory Muscles For Inspiration] : no accessory muscle use [Abdomen Tenderness] : non-tender [Nail Clubbing] : no clubbing of the fingernails [Abnormal Walk] : normal gait [Abdomen Soft] : soft [No Focal Deficits] : no focal deficits [Cyanosis, Localized] : no localized cyanosis [Oriented To Time, Place, And Person] : oriented to person, place, and time [Elongated Uvula] : no elongated uvula [FreeTextEntry1] : No abnormalities.

## 2020-08-17 NOTE — REVIEW OF SYSTEMS
[Hypertension] : ~T hypertension [Anemia] : anemia [Thyroid Problem] : thyroid problem [As Noted in HPI] : as noted in HPI [Fever] : no fever [Chills] : no chills [Eye Irritation] : no ~T irritation of the eyes [Dry Eyes] : no dryness of the eyes [Nasal Congestion] : no nasal congestion [Epistaxis] : no nosebleeds [Sinus Problems] : no sinus problems [Postnasal Drip] : no postnasal drip [Cough] : no cough [Sputum] : not coughing up ~M sputum [Dyspnea] : no dyspnea [Chest Tightness] : no chest tightness [Pleuritic Pain] : no pleuritic pain [Wheezing] : no wheezing [Chest Discomfort] : no chest discomfort [Dysrhythmia] : no dysrhythmia [Murmurs] : no murmurs were heard [Palpitations] : no palpitations [Edema] : ~T edema was not present [Hay Fever] : no hay fever [Itchy Eyes] : no itching of ~T the eyes [Reflux] : no reflux [Nausea] : no nausea [Vomiting] : no vomiting [Constipation] : no constipation [Diarrhea] : no diarrhea [Trauma] : no ~T physical trauma [Abdominal Pain] : no abdominal pain [Dysuria] : no dysuria [Headache] : no headache [Fracture] : no fracture [Syncope] : no fainting [Numbness] : no numbness [Dizziness] : no dizziness [Paralysis] : no paralysis was seen [Depression] : no depression [Seizures] : no seizures [Diabetes] : no diabetes mellitus [Anxiety] : no anxiety

## 2020-08-17 NOTE — HISTORY OF PRESENT ILLNESS
[Initial Evaluation] : an initial evaluation of [None] : No associated symptoms are reported [Good Compliance] : good compliance with treatment [Good Tolerance] : good tolerance of treatment [Good Symptom Control] : good symptom control [Follow-Up - Routine Clinic] : a routine clinic follow-up of [Currently Experiencing] : The patient is currently experiencing symptoms. [Dyspnea] : dyspnea [Excess Weight] : excess weight [Low Calorie Diet] : low calorie diet [Fair Compliance] : fair compliance with treatment [Knee Pain] : knee pain [Poor Symptom Control] : poor symptom control [Fair Tolerance] : fair tolerance of treatment [Hypertension] : hypertension [Sleep Apnea] : sleep apnea [High] : high [___ Times/Week] : exercises [unfilled] times per week [Well Balanced Diet] : well balanced meals [Low Calorie] : low calorie [Aerobic Conditioning] : aerobic conditioning [FreeTextEntry1] : Patient c/o SOBOE but is otherwise without associated respiratory complaints.\par  [de-identified] : AutoCPAP

## 2020-08-24 ENCOUNTER — APPOINTMENT (OUTPATIENT)
Dept: OBGYN | Facility: CLINIC | Age: 25
End: 2020-08-24
Payer: MEDICAID

## 2020-08-24 VITALS
DIASTOLIC BLOOD PRESSURE: 82 MMHG | WEIGHT: 242.19 LBS | HEART RATE: 82 BPM | SYSTOLIC BLOOD PRESSURE: 129 MMHG | BODY MASS INDEX: 42.91 KG/M2 | HEIGHT: 63 IN

## 2020-08-24 PROCEDURE — 99213 OFFICE O/P EST LOW 20 MIN: CPT

## 2020-09-15 ENCOUNTER — OUTPATIENT (OUTPATIENT)
Dept: OUTPATIENT SERVICES | Facility: HOSPITAL | Age: 25
LOS: 1 days | End: 2020-09-15
Payer: COMMERCIAL

## 2020-09-15 DIAGNOSIS — G47.33 OBSTRUCTIVE SLEEP APNEA (ADULT) (PEDIATRIC): ICD-10-CM

## 2020-09-15 PROCEDURE — 95806 SLEEP STUDY UNATT&RESP EFFT: CPT | Mod: 26

## 2020-09-15 PROCEDURE — G0399: CPT

## 2020-09-15 PROCEDURE — 95806 SLEEP STUDY UNATT&RESP EFFT: CPT

## 2020-09-17 ENCOUNTER — APPOINTMENT (OUTPATIENT)
Dept: PULMONOLOGY | Facility: CLINIC | Age: 25
End: 2020-09-17

## 2020-09-21 ENCOUNTER — APPOINTMENT (OUTPATIENT)
Dept: DISASTER EMERGENCY | Facility: CLINIC | Age: 25
End: 2020-09-21

## 2020-09-21 ENCOUNTER — LABORATORY RESULT (OUTPATIENT)
Age: 25
End: 2020-09-21

## 2020-09-24 ENCOUNTER — APPOINTMENT (OUTPATIENT)
Dept: PULMONOLOGY | Facility: CLINIC | Age: 25
End: 2020-09-24
Payer: MEDICAID

## 2020-09-24 VITALS — DIASTOLIC BLOOD PRESSURE: 78 MMHG | SYSTOLIC BLOOD PRESSURE: 123 MMHG | OXYGEN SATURATION: 99 % | HEART RATE: 68 BPM

## 2020-09-24 PROCEDURE — 94010 BREATHING CAPACITY TEST: CPT

## 2020-09-24 PROCEDURE — 85018 HEMOGLOBIN: CPT | Mod: QW

## 2020-09-24 PROCEDURE — 99214 OFFICE O/P EST MOD 30 MIN: CPT | Mod: 25

## 2020-09-24 PROCEDURE — 94729 DIFFUSING CAPACITY: CPT

## 2020-09-24 PROCEDURE — 94727 GAS DIL/WSHOT DETER LNG VOL: CPT

## 2020-09-24 RX ORDER — ENALAPRIL MALEATE 20 MG/1
20 TABLET ORAL
Qty: 30 | Refills: 0 | Status: DISCONTINUED | COMMUNITY
Start: 2019-07-15 | End: 2020-09-24

## 2020-09-24 NOTE — HISTORY OF PRESENT ILLNESS
[None] : No associated symptoms are reported [Good Compliance] : good compliance with treatment [Good Tolerance] : good tolerance of treatment [Good Symptom Control] : good symptom control [Excess Weight] : excess weight [Currently Experiencing] : The patient is currently experiencing symptoms. [Dyspnea] : dyspnea [Knee Pain] : knee pain [Low Calorie Diet] : low calorie diet [Fair Compliance] : fair compliance with treatment [Fair Tolerance] : fair tolerance of treatment [Poor Symptom Control] : poor symptom control [Sleep Apnea] : sleep apnea [Hypertension] : hypertension [High] : high [Low Calorie] : low calorie [Well Balanced Diet] : well balanced meals [___ Times/Week] : exercises [unfilled] times per week [Aerobic Conditioning] : aerobic conditioning [Follow-Up - Routine Clinic] : a routine clinic follow-up of [FreeTextEntry1] : Patient c/o SOBOE but is otherwise without associated respiratory complaints.\par The patient reports having sleeve gastrectomy in 2/2020.\par She reports changes in smell and taste in 3/2020. She did not get tested but report +IgG though these results are not available to me.\par  [de-identified] : AutoCPAP

## 2020-09-24 NOTE — PROCEDURE
[FreeTextEntry1] : PFTs 9/3/19 - Mild restriction on spirometry with normal lung volumes and moderately reduced diffusion capacity which corrected for alveolar volume.\par PFTs 9/24/20 - normal spirometry and diffusion capacity but mildly reduced lung volumes which is slightly worse than previous.\par

## 2020-09-24 NOTE — DISCUSSION/SUMMARY
[FreeTextEntry1] : \par #1. PFTs performed previously revealed mild restriction on spirometry but with near normal lung volumes and moderately reduced diffusion capacity which corrected for alveolar volume. PFTs now are essentially normal but with reduced lung volumes of unclear etiology or significance.\par #2. The patient does not appear to require chronic BD therapy at this time\par #3. SOBOE is likely related to weight or deconditioning given restriction\par #4. Diet and exercise for weight loss\par #5. She has lost 125 lbs since her surgery\par #6. D/c CPAP given near resolution of ANNAMARIA on HST with weight loss\par #7. CXR given reduced lung volumes\par #8. F/u in 3 months with CXR; follow lung volumes with continued weight loss\par #9. Reviewed risks of exposure and symptoms of Covid-19 virus, including how the virus is spread.\par \par Discussed the following risk-reducing strategies:\par -Wash hands with soap and water (proper technique reviewed) \par -Use alcohol based  when hand-washing is not an option\par -Maintain a social distance of at least 6 feet whenever possible\par -Avoid contact, especially hand shaking\par -Avoid touching eyes, nose, and mouth\par -Cover face/mouth when coughing or sneezing\par -Avoid close contact with sick people\par -Seek medical help if you develop a fever and/or respiratory symptoms (e.g. cough, chest pain, SOB)\par \par Patient's questions were answered and patient appears to understand these recommendations

## 2020-09-24 NOTE — CONSULT LETTER
[Dear  ___] : Dear  [unfilled], [Consult Letter:] : I had the pleasure of evaluating your patient, [unfilled]. [Please see my note below.] : Please see my note below. [Consult Closing:] : Thank you very much for allowing me to participate in the care of this patient.  If you have any questions, please do not hesitate to contact me. [Sincerely,] : Sincerely, [DrYana  ___] : Dr. WALKER [FreeTextEntry3] : Rajeev Pandya MD, FCCP, D. ABSM\par Pulmonary and Sleep Medicine\par [facility] at [place]

## 2020-11-05 ENCOUNTER — RX RENEWAL (OUTPATIENT)
Age: 25
End: 2020-11-05

## 2020-12-15 PROBLEM — Z01.419 ENCOUNTER FOR ROUTINE GYNECOLOGICAL EXAMINATION: Status: RESOLVED | Noted: 2017-12-04 | Resolved: 2020-12-15

## 2020-12-29 ENCOUNTER — APPOINTMENT (OUTPATIENT)
Dept: PULMONOLOGY | Facility: CLINIC | Age: 25
End: 2020-12-29

## 2021-03-23 ENCOUNTER — APPOINTMENT (OUTPATIENT)
Dept: OBGYN | Facility: CLINIC | Age: 26
End: 2021-03-23
Payer: MEDICAID

## 2021-03-23 VITALS
WEIGHT: 208.13 LBS | SYSTOLIC BLOOD PRESSURE: 129 MMHG | DIASTOLIC BLOOD PRESSURE: 78 MMHG | BODY MASS INDEX: 36.87 KG/M2

## 2021-03-23 PROCEDURE — 99395 PREV VISIT EST AGE 18-39: CPT

## 2021-03-23 PROCEDURE — 99072 ADDL SUPL MATRL&STAF TM PHE: CPT

## 2021-03-25 LAB
C TRACH RRNA SPEC QL NAA+PROBE: NOT DETECTED
HPV HIGH+LOW RISK DNA PNL CVX: NOT DETECTED
N GONORRHOEA RRNA SPEC QL NAA+PROBE: NOT DETECTED
SOURCE TP AMPLIFICATION: NORMAL

## 2021-03-29 LAB — CYTOLOGY CVX/VAG DOC THIN PREP: ABNORMAL

## 2021-05-17 ENCOUNTER — APPOINTMENT (OUTPATIENT)
Dept: OBGYN | Facility: CLINIC | Age: 26
End: 2021-05-17
Payer: MEDICAID

## 2021-05-17 VITALS
BODY MASS INDEX: 36.32 KG/M2 | DIASTOLIC BLOOD PRESSURE: 81 MMHG | HEIGHT: 63 IN | SYSTOLIC BLOOD PRESSURE: 130 MMHG | HEART RATE: 80 BPM | WEIGHT: 205 LBS

## 2021-05-17 DIAGNOSIS — N90.89 OTHER SPECIFIED NONINFLAMMATORY DISORDERS OF VULVA AND PERINEUM: ICD-10-CM

## 2021-05-17 PROCEDURE — 56605 BIOPSY OF VULVA/PERINEUM: CPT

## 2021-05-17 PROCEDURE — 99072 ADDL SUPL MATRL&STAF TM PHE: CPT

## 2021-05-17 NOTE — PROCEDURE
[Vulvar Biopsy] : Vulvar Biopsy [____ Lidocaine w/o Epi] : ~VmL lidocaine without epinephrine [Sent to Pathology] : placed in buffered formalin and sent for pathology [Scalpel] : scalpel [Silver Nitrate] : silver nitrate [Tolerated Well] : the patient tolerated the procedure well [No Complications] : there were no complications [de-identified] : vulvar skin lesions

## 2021-05-19 LAB — CORE LAB BIOPSY: NORMAL

## 2021-05-24 ENCOUNTER — TRANSCRIPTION ENCOUNTER (OUTPATIENT)
Age: 26
End: 2021-05-24

## 2021-06-28 ENCOUNTER — APPOINTMENT (OUTPATIENT)
Dept: OBGYN | Facility: CLINIC | Age: 26
End: 2021-06-28
Payer: MEDICAID

## 2021-06-28 VITALS
DIASTOLIC BLOOD PRESSURE: 78 MMHG | SYSTOLIC BLOOD PRESSURE: 132 MMHG | HEIGHT: 63 IN | BODY MASS INDEX: 36.32 KG/M2 | WEIGHT: 205 LBS

## 2021-06-28 PROCEDURE — 99213 OFFICE O/P EST LOW 20 MIN: CPT

## 2021-06-28 PROCEDURE — 81025 URINE PREGNANCY TEST: CPT

## 2021-06-29 LAB
HCG UR QL: POSITIVE
QUALITY CONTROL: YES

## 2021-07-19 ENCOUNTER — NON-APPOINTMENT (OUTPATIENT)
Age: 26
End: 2021-07-19

## 2021-07-19 ENCOUNTER — APPOINTMENT (OUTPATIENT)
Dept: OBGYN | Facility: CLINIC | Age: 26
End: 2021-07-19
Payer: MEDICAID

## 2021-07-19 VITALS
WEIGHT: 203.1 LBS | HEIGHT: 63 IN | SYSTOLIC BLOOD PRESSURE: 125 MMHG | DIASTOLIC BLOOD PRESSURE: 76 MMHG | BODY MASS INDEX: 35.98 KG/M2

## 2021-07-19 PROCEDURE — 0501F PRENATAL FLOW SHEET: CPT

## 2021-07-23 RX ORDER — ETONOGESTREL AND ETHINYL ESTRADIOL 11.7; 2.7 MG/1; MG/1
0.12-0.015 INSERT, EXTENDED RELEASE VAGINAL
Qty: 9 | Refills: 0 | Status: DISCONTINUED | COMMUNITY
Start: 2020-04-29 | End: 2021-07-23

## 2021-07-26 ENCOUNTER — EMERGENCY (EMERGENCY)
Facility: HOSPITAL | Age: 26
LOS: 1 days | Discharge: HOME | End: 2021-07-26
Attending: EMERGENCY MEDICINE
Payer: COMMERCIAL

## 2021-07-26 VITALS
HEIGHT: 64 IN | WEIGHT: 160.06 LBS | OXYGEN SATURATION: 100 % | DIASTOLIC BLOOD PRESSURE: 85 MMHG | RESPIRATION RATE: 16 BRPM | HEART RATE: 85 BPM | SYSTOLIC BLOOD PRESSURE: 135 MMHG | TEMPERATURE: 99 F

## 2021-07-26 LAB
ANION GAP SERPL CALC-SCNC: 13 MMOL/L — SIGNIFICANT CHANGE UP (ref 5–17)
APPEARANCE UR: CLEAR — SIGNIFICANT CHANGE UP
BASOPHILS # BLD AUTO: 0.04 K/UL — SIGNIFICANT CHANGE UP (ref 0–0.2)
BASOPHILS NFR BLD AUTO: 0.4 % — SIGNIFICANT CHANGE UP (ref 0–2)
BILIRUB UR-MCNC: NEGATIVE — SIGNIFICANT CHANGE UP
BUN SERPL-MCNC: 8.3 MG/DL — SIGNIFICANT CHANGE UP (ref 8–20)
CALCIUM SERPL-MCNC: 8.8 MG/DL — SIGNIFICANT CHANGE UP (ref 8.6–10.2)
CHLORIDE SERPL-SCNC: 100 MMOL/L — SIGNIFICANT CHANGE UP (ref 98–107)
CO2 SERPL-SCNC: 22 MMOL/L — SIGNIFICANT CHANGE UP (ref 22–29)
COLOR SPEC: YELLOW — SIGNIFICANT CHANGE UP
CREAT SERPL-MCNC: 0.49 MG/DL — LOW (ref 0.5–1.3)
DIFF PNL FLD: NEGATIVE — SIGNIFICANT CHANGE UP
EOSINOPHIL # BLD AUTO: 0 K/UL — SIGNIFICANT CHANGE UP (ref 0–0.5)
EOSINOPHIL NFR BLD AUTO: 0 % — SIGNIFICANT CHANGE UP (ref 0–6)
GLUCOSE SERPL-MCNC: 84 MG/DL — SIGNIFICANT CHANGE UP (ref 70–99)
GLUCOSE UR QL: NEGATIVE MG/DL — SIGNIFICANT CHANGE UP
HCG SERPL-ACNC: HIGH MIU/ML
HCT VFR BLD CALC: 35.6 % — SIGNIFICANT CHANGE UP (ref 34.5–45)
HGB BLD-MCNC: 12.5 G/DL — SIGNIFICANT CHANGE UP (ref 11.5–15.5)
IMM GRANULOCYTES NFR BLD AUTO: 0.4 % — SIGNIFICANT CHANGE UP (ref 0–1.5)
KETONES UR-MCNC: NEGATIVE — SIGNIFICANT CHANGE UP
LEUKOCYTE ESTERASE UR-ACNC: NEGATIVE — SIGNIFICANT CHANGE UP
LIDOCAIN IGE QN: 45 U/L — SIGNIFICANT CHANGE UP (ref 22–51)
LYMPHOCYTES # BLD AUTO: 1.81 K/UL — SIGNIFICANT CHANGE UP (ref 1–3.3)
LYMPHOCYTES # BLD AUTO: 15.9 % — SIGNIFICANT CHANGE UP (ref 13–44)
MCHC RBC-ENTMCNC: 31.6 PG — SIGNIFICANT CHANGE UP (ref 27–34)
MCHC RBC-ENTMCNC: 35.1 GM/DL — SIGNIFICANT CHANGE UP (ref 32–36)
MCV RBC AUTO: 89.9 FL — SIGNIFICANT CHANGE UP (ref 80–100)
MONOCYTES # BLD AUTO: 0.81 K/UL — SIGNIFICANT CHANGE UP (ref 0–0.9)
MONOCYTES NFR BLD AUTO: 7.1 % — SIGNIFICANT CHANGE UP (ref 2–14)
NEUTROPHILS # BLD AUTO: 8.65 K/UL — HIGH (ref 1.8–7.4)
NEUTROPHILS NFR BLD AUTO: 76.2 % — SIGNIFICANT CHANGE UP (ref 43–77)
NITRITE UR-MCNC: NEGATIVE — SIGNIFICANT CHANGE UP
PH UR: 6.5 — SIGNIFICANT CHANGE UP (ref 5–8)
PLATELET # BLD AUTO: 187 K/UL — SIGNIFICANT CHANGE UP (ref 150–400)
POTASSIUM SERPL-MCNC: 3.9 MMOL/L — SIGNIFICANT CHANGE UP (ref 3.5–5.3)
POTASSIUM SERPL-SCNC: 3.9 MMOL/L — SIGNIFICANT CHANGE UP (ref 3.5–5.3)
PROT UR-MCNC: NEGATIVE MG/DL — SIGNIFICANT CHANGE UP
RBC # BLD: 3.96 M/UL — SIGNIFICANT CHANGE UP (ref 3.8–5.2)
RBC # FLD: 12.4 % — SIGNIFICANT CHANGE UP (ref 10.3–14.5)
SODIUM SERPL-SCNC: 135 MMOL/L — SIGNIFICANT CHANGE UP (ref 135–145)
SP GR SPEC: 1.01 — SIGNIFICANT CHANGE UP (ref 1.01–1.02)
UROBILINOGEN FLD QL: NEGATIVE MG/DL — SIGNIFICANT CHANGE UP
WBC # BLD: 11.36 K/UL — HIGH (ref 3.8–10.5)
WBC # FLD AUTO: 11.36 K/UL — HIGH (ref 3.8–10.5)

## 2021-07-26 PROCEDURE — 87086 URINE CULTURE/COLONY COUNT: CPT

## 2021-07-26 PROCEDURE — 81003 URINALYSIS AUTO W/O SCOPE: CPT

## 2021-07-26 PROCEDURE — 36415 COLL VENOUS BLD VENIPUNCTURE: CPT

## 2021-07-26 PROCEDURE — 80048 BASIC METABOLIC PNL TOTAL CA: CPT

## 2021-07-26 PROCEDURE — 83690 ASSAY OF LIPASE: CPT

## 2021-07-26 PROCEDURE — 85025 COMPLETE CBC W/AUTO DIFF WBC: CPT

## 2021-07-26 PROCEDURE — 99285 EMERGENCY DEPT VISIT HI MDM: CPT

## 2021-07-26 PROCEDURE — 84702 CHORIONIC GONADOTROPIN TEST: CPT

## 2021-07-26 PROCEDURE — 99284 EMERGENCY DEPT VISIT MOD MDM: CPT | Mod: 25

## 2021-07-26 PROCEDURE — 76801 OB US < 14 WKS SINGLE FETUS: CPT | Mod: 26

## 2021-07-26 PROCEDURE — 76801 OB US < 14 WKS SINGLE FETUS: CPT

## 2021-07-26 RX ORDER — GLYCERIN ADULT
1 SUPPOSITORY, RECTAL RECTAL
Qty: 5 | Refills: 0
Start: 2021-07-26 | End: 2021-07-30

## 2021-07-26 RX ORDER — LACTULOSE 10 G/15ML
10 SOLUTION ORAL ONCE
Refills: 0 | Status: COMPLETED | OUTPATIENT
Start: 2021-07-26 | End: 2021-07-26

## 2021-07-26 RX ADMIN — LACTULOSE 10 GRAM(S): 10 SOLUTION ORAL at 15:30

## 2021-07-26 NOTE — ED PROVIDER NOTE - NSFOLLOWUPINSTRUCTIONS_ED_ALL_ED_FT
please drink plenty fluids and take vegetables and prone juice   take suppository as prescribed   call and follow up with OBGYN Within 2-3 days     Constipation, Adult      Constipation is when a person has trouble pooping (having a bowel movement). When you have this condition, you may poop fewer than 3 times a week. Your poop (stool) may also be dry, hard, or bigger than normal.      Follow these instructions at home:      Eating and drinking    •Eat foods that have a lot of fiber, such as:  •Fresh fruits and vegetables.      •Whole grains.      •Beans.      •Eat less of foods that are low in fiber and high in fat and sugar, such as:  •French fries.      •Hamburgers.      •Cookies.      •Candy.      •Soda.        •Drink enough fluid to keep your pee (urine) pale yellow.      General instructions     •Exercise regularly or as told by your doctor. Try to do 150 minutes of exercise each week.      •Go to the restroom when you feel like you need to poop. Do not hold it in.      •Take over-the-counter and prescription medicines only as told by your doctor. These include any fiber supplements.    •When you poop:  •Do deep breathing while relaxing your lower belly (abdomen).      •Relax your pelvic floor. The pelvic floor is a group of muscles that support the rectum, bladder, and intestines (as well as the uterus in women).        •Watch your condition for any changes. Tell your doctor if you notice any.      •Keep all follow-up visits as told by your doctor. This is important.        Contact a doctor if:    •You have pain that gets worse.      •You have a fever.      •You have not pooped for 4 days.      •You vomit.      •You are not hungry.      •You lose weight.      •You are bleeding from the opening of the butt (anus).      •You have thin, pencil-like poop.        Get help right away if:    •You have a fever, and your symptoms suddenly get worse.      •You leak poop or have blood in your poop.      •Your belly feels hard or bigger than normal (bloated).      •You have very bad belly pain.      •You feel dizzy or you faint.        Summary    •Constipation is when a person poops fewer than 3 times a week, has trouble pooping, or has poop that is dry, hard, or bigger than normal.      •Eat foods that have a lot of fiber.      •Drink enough fluid to keep your pee (urine) pale yellow.      •Take over-the-counter and prescription medicines only as told by your doctor. These include any fiber supplements.      This information is not intended to replace advice given to you by your health care provider. Make sure you discuss any questions you have with your health care provider.

## 2021-07-26 NOTE — ED PROVIDER NOTE - CLINICAL SUMMARY MEDICAL DECISION MAKING FREE TEXT BOX
Pt approximately 8 weeks presents with constipation and crampy pain in her upper abdominal as per pt, however on the physical exam she has tenderness in the LLQ. Will check labs, OB US, treat constipation, reassess. Pt approximately 8 weeks pregnant, presents with constipation and cramping pain in her upper abdominal as per pt, however on the physical exam she has tenderness in the LLQ. Will check labs, OB US, treat constipation, reassess.

## 2021-07-26 NOTE — ED PROVIDER NOTE - ATTENDING CONTRIBUTION TO CARE
I, Lili Parker, performed the initial face to face bedside interview with this patient regarding history of present illness, review of symptoms and relevant past medical, social and family history.  I completed an independent physical examination.  I was the initial provider who evaluated this patient. I have signed out the follow up of any pending tests (i.e. labs, radiological studies) to the ACP.  I have communicated the patient’s plan of care and disposition with the ACP.  The history, relevant review of systems, past medical and surgical history, medical decision making, and physical examination was documented by the scribe in my presence and I attest to the accuracy of the documentation.

## 2021-07-26 NOTE — ED PROVIDER NOTE - OBJECTIVE STATEMENT
25 y/o female approximately 8 weeks pregnant, with a PMHx of gastric sleeve, presents to the ED c/o intermittent abdominal cramping and constipation. Pt states she was taking Miralax for constipation, she went to ob/gyn last week and was prescribed dulcolax. Pt states after taking dulcolax she began to have cramping and nausea, so stopped taking it. Denies vaginal bleeding.   ob/gyn: Dr. Titus 25 y/o female approximately 8 weeks pregnant, with a PMHx of gastric sleeve, presents to the ED c/o intermittent abdominal cramping and constipation. Pt states she was taking Miralax for constipation, she went to ob/gyn last week and was prescribed dulcolax. Pt states after taking dulcolax she began to have cramping and nausea, so she stopped taking it. Denies vaginal bleeding.   ob/gyn: Dr. Titus

## 2021-07-26 NOTE — ED ADULT TRIAGE NOTE - CHIEF COMPLAINT QUOTE
pt 9 weeks pregnant came here after taking miralax for three days pt had small BM but now is experiencing cramping. pt states BM was small and incomplete.

## 2021-07-26 NOTE — ED PROVIDER NOTE - PATIENT PORTAL LINK FT
You can access the FollowMyHealth Patient Portal offered by [facility] by registering at the following website: [email or web address]. By joining [email or web address]’s FollowMyHealth portal, you will also be able to view your health information using other applications (apps) compatible with our system.

## 2021-07-26 NOTE — ED PROVIDER NOTE - PROGRESS NOTE DETAILS
pt is sen by Dr powell initially agreed with hx , PE and plan   seen the pt at the bed side still could not have BM . reexamine the abdomen noTTP. will dc on suppository and continue colace and f.u ob

## 2021-07-26 NOTE — ED PROVIDER NOTE - CARE PLAN
Principal Discharge DX:	9 weeks gestation of pregnancy  Secondary Diagnosis:	Constipation, unspecified constipation type

## 2021-07-28 ENCOUNTER — NON-APPOINTMENT (OUTPATIENT)
Age: 26
End: 2021-07-28

## 2021-07-28 ENCOUNTER — APPOINTMENT (OUTPATIENT)
Dept: OBGYN | Facility: CLINIC | Age: 26
End: 2021-07-28
Payer: MEDICAID

## 2021-07-28 VITALS
BODY MASS INDEX: 35.18 KG/M2 | WEIGHT: 198.56 LBS | SYSTOLIC BLOOD PRESSURE: 122 MMHG | DIASTOLIC BLOOD PRESSURE: 88 MMHG | HEIGHT: 63 IN

## 2021-07-28 LAB
CULTURE RESULTS: SIGNIFICANT CHANGE UP
SPECIMEN SOURCE: SIGNIFICANT CHANGE UP

## 2021-07-28 PROCEDURE — 0501F PRENATAL FLOW SHEET: CPT

## 2021-07-28 PROCEDURE — 81025 URINE PREGNANCY TEST: CPT

## 2021-07-30 DIAGNOSIS — Z3A.09 9 WEEKS GESTATION OF PREGNANCY: ICD-10-CM

## 2021-07-30 DIAGNOSIS — Z3A.08 8 WEEKS GESTATION OF PREGNANCY: ICD-10-CM

## 2021-08-05 ENCOUNTER — LABORATORY RESULT (OUTPATIENT)
Age: 26
End: 2021-08-05

## 2021-08-06 ENCOUNTER — APPOINTMENT (OUTPATIENT)
Dept: OBGYN | Facility: CLINIC | Age: 26
End: 2021-08-06
Payer: MEDICAID

## 2021-08-06 ENCOUNTER — NON-APPOINTMENT (OUTPATIENT)
Age: 26
End: 2021-08-06

## 2021-08-06 ENCOUNTER — ASOB RESULT (OUTPATIENT)
Age: 26
End: 2021-08-06

## 2021-08-06 ENCOUNTER — APPOINTMENT (OUTPATIENT)
Dept: ANTEPARTUM | Facility: CLINIC | Age: 26
End: 2021-08-06
Payer: MEDICAID

## 2021-08-06 VITALS
WEIGHT: 198 LBS | BODY MASS INDEX: 35.08 KG/M2 | SYSTOLIC BLOOD PRESSURE: 128 MMHG | DIASTOLIC BLOOD PRESSURE: 80 MMHG | HEIGHT: 63 IN

## 2021-08-06 DIAGNOSIS — N90.89 OTHER SPECIFIED NONINFLAMMATORY DISORDERS OF VULVA AND PERINEUM: ICD-10-CM

## 2021-08-06 DIAGNOSIS — N91.1 SECONDARY AMENORRHEA: ICD-10-CM

## 2021-08-06 DIAGNOSIS — Z87.42 PERSONAL HISTORY OF OTHER DISEASES OF THE FEMALE GENITAL TRACT: ICD-10-CM

## 2021-08-06 DIAGNOSIS — R06.02 SHORTNESS OF BREATH: ICD-10-CM

## 2021-08-06 DIAGNOSIS — Z30.09 ENCOUNTER FOR OTHER GENERAL COUNSELING AND ADVICE ON CONTRACEPTION: ICD-10-CM

## 2021-08-06 DIAGNOSIS — Z86.2 PERSONAL HISTORY OF DISEASES OF THE BLOOD AND BLOOD-FORMING ORGANS AND CERTAIN DISORDERS INVOLVING THE IMMUNE MECHANISM: ICD-10-CM

## 2021-08-06 DIAGNOSIS — Z71.89 OTHER SPECIFIED COUNSELING: ICD-10-CM

## 2021-08-06 DIAGNOSIS — N92.1 EXCESSIVE AND FREQUENT MENSTRUATION WITH IRREGULAR CYCLE: ICD-10-CM

## 2021-08-06 DIAGNOSIS — Z86.69 PERSONAL HISTORY OF OTHER DISEASES OF THE NERVOUS SYSTEM AND SENSE ORGANS: ICD-10-CM

## 2021-08-06 PROCEDURE — 0502F SUBSEQUENT PRENATAL CARE: CPT

## 2021-08-06 PROCEDURE — 76801 OB US < 14 WKS SINGLE FETUS: CPT

## 2021-08-07 LAB
ALBUMIN SERPL ELPH-MCNC: 4.3 G/DL
ALP BLD-CCNC: 53 U/L
ALT SERPL-CCNC: 6 U/L
ANION GAP SERPL CALC-SCNC: 19 MMOL/L
AST SERPL-CCNC: 14 U/L
BASOPHILS # BLD AUTO: 0.03 K/UL
BASOPHILS NFR BLD AUTO: 0.3 %
BILIRUB SERPL-MCNC: 0.4 MG/DL
BUN SERPL-MCNC: 7 MG/DL
CALCIUM SERPL-MCNC: 9.3 MG/DL
CHLORIDE SERPL-SCNC: 99 MMOL/L
CO2 SERPL-SCNC: 21 MMOL/L
CREAT SERPL-MCNC: 0.62 MG/DL
EOSINOPHIL # BLD AUTO: 0 K/UL
EOSINOPHIL NFR BLD AUTO: 0 %
ESTIMATED AVERAGE GLUCOSE: 100 MG/DL
GLUCOSE SERPL-MCNC: 31 MG/DL
HBA1C MFR BLD HPLC: 5.1 %
HBV SURFACE AG SER QL: NONREACTIVE
HCT VFR BLD CALC: 40.7 %
HCV AB SER QL: NONREACTIVE
HCV S/CO RATIO: 0.16 S/CO
HGB BLD-MCNC: 13.5 G/DL
HIV1+2 AB SPEC QL IA.RAPID: NONREACTIVE
IMM GRANULOCYTES NFR BLD AUTO: 0.2 %
LYMPHOCYTES # BLD AUTO: 1.8 K/UL
LYMPHOCYTES NFR BLD AUTO: 20.5 %
MAN DIFF?: NORMAL
MCHC RBC-ENTMCNC: 31.4 PG
MCHC RBC-ENTMCNC: 33.2 GM/DL
MCV RBC AUTO: 94.7 FL
MONOCYTES # BLD AUTO: 0.66 K/UL
MONOCYTES NFR BLD AUTO: 7.5 %
NEUTROPHILS # BLD AUTO: 6.26 K/UL
NEUTROPHILS NFR BLD AUTO: 71.5 %
PLATELET # BLD AUTO: 193 K/UL
POTASSIUM SERPL-SCNC: 3.8 MMOL/L
PROT SERPL-MCNC: 6.8 G/DL
RBC # BLD: 4.3 M/UL
RBC # FLD: 13.2 %
SODIUM SERPL-SCNC: 139 MMOL/L
T PALLIDUM AB SER QL IA: NEGATIVE
TSH SERPL-ACNC: 0.79 UIU/ML
WBC # FLD AUTO: 8.77 K/UL

## 2021-08-09 LAB
CMV IGG SERPL QL: 2.7 U/ML
CMV IGG SERPL-IMP: POSITIVE
CMV IGM SERPL QL: 11.5 AU/ML
CMV IGM SERPL QL: NEGATIVE
HGB A MFR BLD: 97.6 %
HGB A2 MFR BLD: 2.4 %
HGB FRACT BLD-IMP: NORMAL
M TB IFN-G BLD-IMP: NEGATIVE
MEV IGG FLD QL IA: >300 AU/ML
MEV IGG+IGM SER-IMP: POSITIVE
MUV AB SER-ACNC: POSITIVE
MUV IGG SER QL IA: >300 AU/ML
QUANTIFERON TB PLUS MITOGEN MINUS NIL: >10 IU/ML
QUANTIFERON TB PLUS NIL: 0.03 IU/ML
QUANTIFERON TB PLUS TB1 MINUS NIL: -0.01 IU/ML
QUANTIFERON TB PLUS TB2 MINUS NIL: -0.01 IU/ML
RUBV IGG FLD-ACNC: 4 INDEX
RUBV IGG SER-IMP: POSITIVE
T GONDII AB SER-IMP: NEGATIVE
T GONDII AB SER-IMP: NEGATIVE
T GONDII IGG SER QL: <3 IU/ML
T GONDII IGM SER QL: <3 AU/ML
VZV AB TITR SER: POSITIVE
VZV IGG SER IF-ACNC: 1234 INDEX

## 2021-08-10 LAB — LEAD BLD-MCNC: <1 UG/DL

## 2021-08-11 LAB
B19V IGG SER QL IA: 0.6 INDEX
B19V IGG+IGM SER-IMP: NEGATIVE
B19V IGG+IGM SER-IMP: NORMAL
B19V IGM FLD-ACNC: 0.25 INDEX
B19V IGM SER-ACNC: NEGATIVE

## 2021-08-12 LAB — AR GENE MUT ANL BLD/T: NORMAL

## 2021-08-13 LAB — FMR1 GENE MUT ANL BLD/T: NORMAL

## 2021-08-16 LAB — CFTR MUT TESTED BLD/T: NEGATIVE

## 2021-08-18 ENCOUNTER — ASOB RESULT (OUTPATIENT)
Age: 26
End: 2021-08-18

## 2021-08-18 ENCOUNTER — APPOINTMENT (OUTPATIENT)
Dept: ANTEPARTUM | Facility: CLINIC | Age: 26
End: 2021-08-18
Payer: MEDICAID

## 2021-08-18 VITALS — WEIGHT: 201 LBS | BODY MASS INDEX: 35.61 KG/M2 | HEIGHT: 63 IN

## 2021-08-18 PROCEDURE — 36416 COLLJ CAPILLARY BLOOD SPEC: CPT

## 2021-08-18 PROCEDURE — 76813 OB US NUCHAL MEAS 1 GEST: CPT

## 2021-08-23 LAB
1ST TRIMESTER DATA: NORMAL
ADDENDUM DOC: NORMAL
AFP PNL SERPL: NORMAL
AFP SERPL-ACNC: NORMAL
CLINICAL BIOCHEMIST REVIEW: NORMAL
FREE BETA HCG 1ST TRIMESTER: NORMAL
Lab: NORMAL
NOTES NTD: NORMAL
NT: NORMAL
PAPP-A SERPL-ACNC: NORMAL
TRISOMY 18/3: NORMAL

## 2021-08-27 ENCOUNTER — APPOINTMENT (OUTPATIENT)
Dept: OBGYN | Facility: CLINIC | Age: 26
End: 2021-08-27
Payer: MEDICAID

## 2021-08-27 VITALS
BODY MASS INDEX: 35.67 KG/M2 | WEIGHT: 201.3 LBS | SYSTOLIC BLOOD PRESSURE: 122 MMHG | HEIGHT: 63 IN | DIASTOLIC BLOOD PRESSURE: 82 MMHG

## 2021-08-27 PROCEDURE — 0502F SUBSEQUENT PRENATAL CARE: CPT

## 2021-09-13 ENCOUNTER — APPOINTMENT (OUTPATIENT)
Dept: ANTEPARTUM | Facility: CLINIC | Age: 26
End: 2021-09-13
Payer: MEDICAID

## 2021-09-13 VITALS — WEIGHT: 200 LBS | BODY MASS INDEX: 35.43 KG/M2

## 2021-09-13 PROCEDURE — 36415 COLL VENOUS BLD VENIPUNCTURE: CPT

## 2021-09-17 DIAGNOSIS — Z3A.12 12 WEEKS GESTATION OF PREGNANCY: ICD-10-CM

## 2021-09-17 DIAGNOSIS — Z3A.13 13 WEEKS GESTATION OF PREGNANCY: ICD-10-CM

## 2021-09-17 LAB
1ST TRIMESTER DATA: NORMAL
2ND TRIMESTER DATA: NORMAL
AFP PNL SERPL: NORMAL
AFP SERPL-ACNC: NORMAL
AFP SERPL-ACNC: NORMAL
B-HCG FREE SERPL-MCNC: NORMAL
CLINICAL BIOCHEMIST REVIEW: NORMAL
FREE BETA HCG 1ST TRIMESTER: NORMAL
INHIBIN A SERPL-MCNC: NORMAL
NOTES NTD: NORMAL
NT: NORMAL
PAPP-A SERPL-ACNC: NORMAL
U ESTRIOL SERPL-SCNC: NORMAL

## 2021-09-24 ENCOUNTER — APPOINTMENT (OUTPATIENT)
Dept: OBGYN | Facility: CLINIC | Age: 26
End: 2021-09-24
Payer: MEDICAID

## 2021-09-24 VITALS
WEIGHT: 203.44 LBS | SYSTOLIC BLOOD PRESSURE: 123 MMHG | BODY MASS INDEX: 36.05 KG/M2 | DIASTOLIC BLOOD PRESSURE: 79 MMHG | HEIGHT: 63 IN

## 2021-09-24 PROCEDURE — 0502F SUBSEQUENT PRENATAL CARE: CPT

## 2021-10-08 DIAGNOSIS — Z3A.17 17 WEEKS GESTATION OF PREGNANCY: ICD-10-CM

## 2021-10-11 ENCOUNTER — ASOB RESULT (OUTPATIENT)
Age: 26
End: 2021-10-11

## 2021-10-11 ENCOUNTER — APPOINTMENT (OUTPATIENT)
Dept: ANTEPARTUM | Facility: CLINIC | Age: 26
End: 2021-10-11
Payer: MEDICAID

## 2021-10-11 PROCEDURE — 76811 OB US DETAILED SNGL FETUS: CPT

## 2021-10-11 PROCEDURE — 76817 TRANSVAGINAL US OBSTETRIC: CPT

## 2021-10-15 ENCOUNTER — NON-APPOINTMENT (OUTPATIENT)
Age: 26
End: 2021-10-15

## 2021-10-15 ENCOUNTER — APPOINTMENT (OUTPATIENT)
Dept: OBGYN | Facility: CLINIC | Age: 26
End: 2021-10-15
Payer: MEDICAID

## 2021-10-15 VITALS
WEIGHT: 207.19 LBS | HEART RATE: 109 BPM | HEIGHT: 63 IN | DIASTOLIC BLOOD PRESSURE: 74 MMHG | SYSTOLIC BLOOD PRESSURE: 137 MMHG | BODY MASS INDEX: 36.71 KG/M2

## 2021-10-15 PROCEDURE — 0502F SUBSEQUENT PRENATAL CARE: CPT

## 2021-10-21 ENCOUNTER — APPOINTMENT (OUTPATIENT)
Dept: OBGYN | Facility: CLINIC | Age: 26
End: 2021-10-21
Payer: MEDICAID

## 2021-10-21 VITALS
DIASTOLIC BLOOD PRESSURE: 76 MMHG | HEIGHT: 63 IN | WEIGHT: 207.5 LBS | BODY MASS INDEX: 36.77 KG/M2 | SYSTOLIC BLOOD PRESSURE: 124 MMHG

## 2021-10-21 DIAGNOSIS — Z3A.20 20 WEEKS GESTATION OF PREGNANCY: ICD-10-CM

## 2021-10-21 PROCEDURE — 0502F SUBSEQUENT PRENATAL CARE: CPT

## 2021-11-05 DIAGNOSIS — Z3A.23 23 WEEKS GESTATION OF PREGNANCY: ICD-10-CM

## 2021-11-08 DIAGNOSIS — Z34.91 ENCOUNTER FOR SUPERVISION OF NORMAL PREGNANCY, UNSPECIFIED, FIRST TRIMESTER: ICD-10-CM

## 2021-11-12 ENCOUNTER — NON-APPOINTMENT (OUTPATIENT)
Age: 26
End: 2021-11-12

## 2021-11-12 ENCOUNTER — APPOINTMENT (OUTPATIENT)
Dept: OBGYN | Facility: CLINIC | Age: 26
End: 2021-11-12
Payer: MEDICAID

## 2021-11-12 VITALS
BODY MASS INDEX: 37.63 KG/M2 | SYSTOLIC BLOOD PRESSURE: 122 MMHG | WEIGHT: 212.38 LBS | DIASTOLIC BLOOD PRESSURE: 88 MMHG | HEIGHT: 63 IN

## 2021-11-12 PROCEDURE — 0502F SUBSEQUENT PRENATAL CARE: CPT

## 2021-11-13 LAB
BASOPHILS # BLD AUTO: 0.03 K/UL
BASOPHILS NFR BLD AUTO: 0.4 %
EOSINOPHIL # BLD AUTO: 0.03 K/UL
EOSINOPHIL NFR BLD AUTO: 0.4 %
GLUCOSE 1H P 50 G GLC PO SERPL-MCNC: 135 MG/DL
HCT VFR BLD CALC: 34.2 %
HGB BLD-MCNC: 11.8 G/DL
IMM GRANULOCYTES NFR BLD AUTO: 0.5 %
LYMPHOCYTES # BLD AUTO: 1.74 K/UL
LYMPHOCYTES NFR BLD AUTO: 20.4 %
MAN DIFF?: NORMAL
MCHC RBC-ENTMCNC: 33 PG
MCHC RBC-ENTMCNC: 34.5 GM/DL
MCV RBC AUTO: 95.5 FL
MONOCYTES # BLD AUTO: 0.72 K/UL
MONOCYTES NFR BLD AUTO: 8.4 %
NEUTROPHILS # BLD AUTO: 5.97 K/UL
NEUTROPHILS NFR BLD AUTO: 69.9 %
PLATELET # BLD AUTO: 189 K/UL
RBC # BLD: 3.58 M/UL
RBC # FLD: 13.2 %
WBC # FLD AUTO: 8.53 K/UL

## 2021-11-19 ENCOUNTER — NON-APPOINTMENT (OUTPATIENT)
Age: 26
End: 2021-11-19

## 2021-12-03 ENCOUNTER — APPOINTMENT (OUTPATIENT)
Dept: OBGYN | Facility: CLINIC | Age: 26
End: 2021-12-03
Payer: MEDICAID

## 2021-12-03 VITALS
BODY MASS INDEX: 37.39 KG/M2 | WEIGHT: 211 LBS | DIASTOLIC BLOOD PRESSURE: 72 MMHG | HEIGHT: 63 IN | SYSTOLIC BLOOD PRESSURE: 110 MMHG

## 2021-12-03 PROCEDURE — 0502F SUBSEQUENT PRENATAL CARE: CPT

## 2021-12-06 ENCOUNTER — ASOB RESULT (OUTPATIENT)
Age: 26
End: 2021-12-06

## 2021-12-06 ENCOUNTER — APPOINTMENT (OUTPATIENT)
Dept: ANTEPARTUM | Facility: CLINIC | Age: 26
End: 2021-12-06
Payer: MEDICAID

## 2021-12-06 PROCEDURE — 76816 OB US FOLLOW-UP PER FETUS: CPT

## 2021-12-10 DIAGNOSIS — Z13.71 ENCOUNTER FOR NONPROCREATIVE SCREENING FOR GENETIC DISEASE CARRIER STATUS: ICD-10-CM

## 2021-12-10 DIAGNOSIS — Z3A.24 24 WEEKS GESTATION OF PREGNANCY: ICD-10-CM

## 2021-12-17 ENCOUNTER — NON-APPOINTMENT (OUTPATIENT)
Age: 26
End: 2021-12-17

## 2021-12-23 ENCOUNTER — APPOINTMENT (OUTPATIENT)
Dept: OBGYN | Facility: CLINIC | Age: 26
End: 2021-12-23
Payer: MEDICAID

## 2021-12-23 VITALS
BODY MASS INDEX: 37.84 KG/M2 | HEIGHT: 63 IN | WEIGHT: 213.56 LBS | DIASTOLIC BLOOD PRESSURE: 72 MMHG | SYSTOLIC BLOOD PRESSURE: 114 MMHG

## 2021-12-23 PROCEDURE — 0502F SUBSEQUENT PRENATAL CARE: CPT

## 2021-12-28 DIAGNOSIS — Z3A.28 28 WEEKS GESTATION OF PREGNANCY: ICD-10-CM

## 2021-12-28 DIAGNOSIS — Z34.92 ENCOUNTER FOR SUPERVISION OF NORMAL PREGNANCY, UNSPECIFIED, SECOND TRIMESTER: ICD-10-CM

## 2021-12-28 DIAGNOSIS — Z86.19 PERSONAL HISTORY OF OTHER INFECTIOUS AND PARASITIC DISEASES: ICD-10-CM

## 2021-12-28 DIAGNOSIS — Z3A.30 30 WEEKS GESTATION OF PREGNANCY: ICD-10-CM

## 2021-12-29 ENCOUNTER — NON-APPOINTMENT (OUTPATIENT)
Age: 26
End: 2021-12-29

## 2022-01-03 ENCOUNTER — APPOINTMENT (OUTPATIENT)
Dept: ANTEPARTUM | Facility: CLINIC | Age: 27
End: 2022-01-03
Payer: MEDICAID

## 2022-01-03 ENCOUNTER — ASOB RESULT (OUTPATIENT)
Age: 27
End: 2022-01-03

## 2022-01-03 PROCEDURE — 76816 OB US FOLLOW-UP PER FETUS: CPT

## 2022-01-03 PROCEDURE — 76819 FETAL BIOPHYS PROFIL W/O NST: CPT

## 2022-01-06 ENCOUNTER — NON-APPOINTMENT (OUTPATIENT)
Age: 27
End: 2022-01-06

## 2022-01-06 ENCOUNTER — APPOINTMENT (OUTPATIENT)
Dept: OBGYN | Facility: CLINIC | Age: 27
End: 2022-01-06
Payer: MEDICAID

## 2022-01-06 VITALS
SYSTOLIC BLOOD PRESSURE: 125 MMHG | HEIGHT: 63 IN | BODY MASS INDEX: 37.56 KG/M2 | WEIGHT: 212 LBS | DIASTOLIC BLOOD PRESSURE: 73 MMHG

## 2022-01-06 PROCEDURE — 0502F SUBSEQUENT PRENATAL CARE: CPT

## 2022-01-07 DIAGNOSIS — Z3A.32 32 WEEKS GESTATION OF PREGNANCY: ICD-10-CM

## 2022-01-07 LAB
ESTIMATED AVERAGE GLUCOSE: 97 MG/DL
GLUCOSE SERPL-MCNC: 42 MG/DL
HBA1C MFR BLD HPLC: 5 %

## 2022-01-20 ENCOUNTER — APPOINTMENT (OUTPATIENT)
Dept: OBGYN | Facility: CLINIC | Age: 27
End: 2022-01-20
Payer: MEDICAID

## 2022-01-20 VITALS
SYSTOLIC BLOOD PRESSURE: 117 MMHG | DIASTOLIC BLOOD PRESSURE: 78 MMHG | BODY MASS INDEX: 38.98 KG/M2 | HEIGHT: 63 IN | WEIGHT: 220 LBS

## 2022-01-20 PROCEDURE — 0502F SUBSEQUENT PRENATAL CARE: CPT

## 2022-01-26 ENCOUNTER — OUTPATIENT (OUTPATIENT)
Dept: OUTPATIENT SERVICES | Facility: HOSPITAL | Age: 27
LOS: 1 days | End: 2022-01-26
Payer: COMMERCIAL

## 2022-01-26 ENCOUNTER — EMERGENCY (EMERGENCY)
Facility: HOSPITAL | Age: 27
LOS: 1 days | Discharge: HOME | End: 2022-01-26
Attending: EMERGENCY MEDICINE
Payer: COMMERCIAL

## 2022-01-26 ENCOUNTER — OUTPATIENT (OUTPATIENT)
Dept: INPATIENT UNIT | Facility: HOSPITAL | Age: 27
LOS: 1 days | End: 2022-01-26
Payer: COMMERCIAL

## 2022-01-26 VITALS — HEART RATE: 75 BPM | TEMPERATURE: 98 F | SYSTOLIC BLOOD PRESSURE: 125 MMHG | DIASTOLIC BLOOD PRESSURE: 78 MMHG

## 2022-01-26 VITALS
RESPIRATION RATE: 18 BRPM | HEART RATE: 69 BPM | OXYGEN SATURATION: 100 % | SYSTOLIC BLOOD PRESSURE: 133 MMHG | HEIGHT: 64 IN | DIASTOLIC BLOOD PRESSURE: 85 MMHG | TEMPERATURE: 99 F

## 2022-01-26 VITALS
DIASTOLIC BLOOD PRESSURE: 63 MMHG | TEMPERATURE: 98 F | SYSTOLIC BLOOD PRESSURE: 108 MMHG | HEART RATE: 77 BPM | RESPIRATION RATE: 18 BRPM

## 2022-01-26 VITALS — RESPIRATION RATE: 18 BRPM | TEMPERATURE: 98 F

## 2022-01-26 VITALS — SYSTOLIC BLOOD PRESSURE: 101 MMHG | DIASTOLIC BLOOD PRESSURE: 69 MMHG | HEART RATE: 84 BPM

## 2022-01-26 DIAGNOSIS — O47.03 FALSE LABOR BEFORE 37 COMPLETED WEEKS OF GESTATION, THIRD TRIMESTER: ICD-10-CM

## 2022-01-26 DIAGNOSIS — Z90.3 ACQUIRED ABSENCE OF STOMACH [PART OF]: Chronic | ICD-10-CM

## 2022-01-26 LAB
ANION GAP SERPL CALC-SCNC: 9 MMOL/L — SIGNIFICANT CHANGE UP (ref 5–17)
APPEARANCE UR: ABNORMAL
BACTERIA # UR AUTO: ABNORMAL
BILIRUB UR-MCNC: NEGATIVE — SIGNIFICANT CHANGE UP
BUN SERPL-MCNC: 6.9 MG/DL — LOW (ref 8–20)
CALCIUM SERPL-MCNC: 9.4 MG/DL — SIGNIFICANT CHANGE UP (ref 8.6–10.2)
CHLORIDE SERPL-SCNC: 103 MMOL/L — SIGNIFICANT CHANGE UP (ref 98–107)
CO2 SERPL-SCNC: 24 MMOL/L — SIGNIFICANT CHANGE UP (ref 22–29)
COLOR SPEC: YELLOW — SIGNIFICANT CHANGE UP
CREAT SERPL-MCNC: 0.53 MG/DL — SIGNIFICANT CHANGE UP (ref 0.5–1.3)
DIFF PNL FLD: NEGATIVE — SIGNIFICANT CHANGE UP
EPI CELLS # UR: SIGNIFICANT CHANGE UP
GLUCOSE BLDC GLUCOMTR-MCNC: 71 MG/DL — SIGNIFICANT CHANGE UP (ref 70–99)
GLUCOSE BLDC GLUCOMTR-MCNC: 78 MG/DL — SIGNIFICANT CHANGE UP (ref 70–99)
GLUCOSE SERPL-MCNC: 89 MG/DL — SIGNIFICANT CHANGE UP (ref 70–99)
GLUCOSE UR QL: NEGATIVE MG/DL — SIGNIFICANT CHANGE UP
HCT VFR BLD CALC: 35.5 % — SIGNIFICANT CHANGE UP (ref 34.5–45)
HGB BLD-MCNC: 12.3 G/DL — SIGNIFICANT CHANGE UP (ref 11.5–15.5)
KETONES UR-MCNC: NEGATIVE — SIGNIFICANT CHANGE UP
LEUKOCYTE ESTERASE UR-ACNC: NEGATIVE — SIGNIFICANT CHANGE UP
MAGNESIUM SERPL-MCNC: 1.6 MG/DL — LOW (ref 1.8–2.6)
MCHC RBC-ENTMCNC: 31.4 PG — SIGNIFICANT CHANGE UP (ref 27–34)
MCHC RBC-ENTMCNC: 34.6 GM/DL — SIGNIFICANT CHANGE UP (ref 32–36)
MCV RBC AUTO: 90.6 FL — SIGNIFICANT CHANGE UP (ref 80–100)
NITRITE UR-MCNC: NEGATIVE — SIGNIFICANT CHANGE UP
PH UR: 6.5 — SIGNIFICANT CHANGE UP (ref 5–8)
PHOSPHATE SERPL-MCNC: 3.6 MG/DL — SIGNIFICANT CHANGE UP (ref 2.4–4.7)
PLATELET # BLD AUTO: 170 K/UL — SIGNIFICANT CHANGE UP (ref 150–400)
POTASSIUM SERPL-MCNC: 4.3 MMOL/L — SIGNIFICANT CHANGE UP (ref 3.5–5.3)
POTASSIUM SERPL-SCNC: 4.3 MMOL/L — SIGNIFICANT CHANGE UP (ref 3.5–5.3)
PROT UR-MCNC: NEGATIVE — SIGNIFICANT CHANGE UP
RAPID RVP RESULT: SIGNIFICANT CHANGE UP
RBC # BLD: 3.92 M/UL — SIGNIFICANT CHANGE UP (ref 3.8–5.2)
RBC # FLD: 12.9 % — SIGNIFICANT CHANGE UP (ref 10.3–14.5)
RBC CASTS # UR COMP ASSIST: NEGATIVE /HPF — SIGNIFICANT CHANGE UP (ref 0–4)
SARS-COV-2 RNA SPEC QL NAA+PROBE: SIGNIFICANT CHANGE UP
SODIUM SERPL-SCNC: 136 MMOL/L — SIGNIFICANT CHANGE UP (ref 135–145)
SP GR SPEC: 1 — LOW (ref 1.01–1.02)
UROBILINOGEN FLD QL: NEGATIVE MG/DL — SIGNIFICANT CHANGE UP
WBC # BLD: 8.67 K/UL — SIGNIFICANT CHANGE UP (ref 3.8–10.5)
WBC # FLD AUTO: 8.67 K/UL — SIGNIFICANT CHANGE UP (ref 3.8–10.5)
WBC UR QL: NEGATIVE /HPF — SIGNIFICANT CHANGE UP (ref 0–5)

## 2022-01-26 PROCEDURE — 36415 COLL VENOUS BLD VENIPUNCTURE: CPT

## 2022-01-26 PROCEDURE — 99212 OFFICE O/P EST SF 10 MIN: CPT

## 2022-01-26 PROCEDURE — 80048 BASIC METABOLIC PNL TOTAL CA: CPT

## 2022-01-26 PROCEDURE — 85027 COMPLETE CBC AUTOMATED: CPT

## 2022-01-26 PROCEDURE — 99284 EMERGENCY DEPT VISIT MOD MDM: CPT

## 2022-01-26 PROCEDURE — 93010 ELECTROCARDIOGRAM REPORT: CPT

## 2022-01-26 PROCEDURE — 83735 ASSAY OF MAGNESIUM: CPT

## 2022-01-26 PROCEDURE — 84100 ASSAY OF PHOSPHORUS: CPT

## 2022-01-26 PROCEDURE — 82962 GLUCOSE BLOOD TEST: CPT

## 2022-01-26 PROCEDURE — 93005 ELECTROCARDIOGRAM TRACING: CPT

## 2022-01-26 PROCEDURE — 99283 EMERGENCY DEPT VISIT LOW MDM: CPT

## 2022-01-26 PROCEDURE — 59025 FETAL NON-STRESS TEST: CPT

## 2022-01-26 PROCEDURE — 0225U NFCT DS DNA&RNA 21 SARSCOV2: CPT

## 2022-01-26 PROCEDURE — 81001 URINALYSIS AUTO W/SCOPE: CPT

## 2022-01-26 PROCEDURE — G0463: CPT

## 2022-01-26 RX ORDER — LEVOTHYROXINE SODIUM 125 MCG
0 TABLET ORAL
Qty: 0 | Refills: 0 | DISCHARGE

## 2022-01-26 RX ORDER — METFORMIN HYDROCHLORIDE 850 MG/1
2 TABLET ORAL
Qty: 0 | Refills: 0 | DISCHARGE

## 2022-01-26 RX ORDER — LEVOTHYROXINE SODIUM 125 MCG
75 TABLET ORAL
Qty: 0 | Refills: 0 | DISCHARGE

## 2022-01-26 RX ORDER — HYDROCORTISONE 1 %
1 OINTMENT (GRAM) TOPICAL EVERY 6 HOURS
Refills: 0 | Status: DISCONTINUED | OUTPATIENT
Start: 2022-01-26 | End: 2022-02-10

## 2022-01-26 RX ADMIN — Medication 1 APPLICATION(S): at 16:59

## 2022-01-26 NOTE — OB PROVIDER TRIAGE NOTE - ATTENDING COMMENTS
26y  at 35w2d by LMP presents from the ED after an episode of weakness, lightheadedness, chest palpitations, and cold sweats that happened earlier this evening. SHe states she was trying to sleep when it happened, and she was unable to dress herself and she ended up calling 911. She was evaluated in the ED and EKG was normal. PT states significant pain from hemorrhoids and that was the only other thing associated with these episodes.   VSS  FHT reactive  toco irritable  SVE: closed  Glucose - 71  25 y/o  @ 35+2 here after what sounds like near syncopal episodes - workup unremarkable  - advise patient to adequate PO hydrate  - recommend Preparation H for hemorrhoids   - UA sent and will f/u  - pt has f/u in office in 10 days      Serenity Chou MD

## 2022-01-26 NOTE — OB RN TRIAGE NOTE - CHIEF COMPLAINT QUOTE
pt reports "hemorrhoids, burning in rectum, loose stools, takes miralax, cramping, weakness on my shoulders and neck and shortness of breath"

## 2022-01-26 NOTE — OB PROVIDER TRIAGE NOTE - NSOBPROVIDERNOTE_OBGYN_ALL_OB_FT
26y  at 35w2d presents for examination after syncopal-like symptoms, now r/o  labor.    #r/o  labor  - toco shows irregular contractions.  - will give hydration and re-evaluate 26y  at 35w2d by LMP presents for examination after syncopal-like symptoms, now r/o  labor.    - VSS  - NST reactive   - [place]: irregular contractions --> uterine irritability  - POCT finger stick 71   - SVE: closed/long/high   - Encouraged PO hydration  - UA ordered, results pending   - continue to monitor    D/w Dr. Chou 26y  at 35w2d by LMP presents for examination after syncopal-like symptoms, now r/o  labor.    - VSS  - NST reactive   - [place]: irregular contractions --> uterine irritability  - POCT finger stick 71   - SVE: closed/long/high   - Encouraged PO hydration  - UA ordered, results pending, will followup with results     Dispo: home     D/w Dr. Chou 26y  at 35w2d by LMP presents for examination after syncopal-like symptoms, now r/o  labor.    - VSS  - NST reactive   - [place]: irregular contractions --> uterine irritability  - POCT finger stick 71   - SVE: closed/long/high   - Patient denies medication for headache, states it is mild.   - Encouraged PO hydration  - UA ordered, results pending, will followup with results     Dispo: home     D/w Dr. Chou

## 2022-01-26 NOTE — OB RN TRIAGE NOTE - FALL HARM RISK - UNIVERSAL INTERVENTIONS
Bed in lowest position, wheels locked, appropriate side rails in place/Call bell, personal items and telephone in reach/Instruct patient to call for assistance before getting out of bed or chair/Non-slip footwear when patient is out of bed/[place] to call system/Physically safe environment - no spills, clutter or unnecessary equipment/Purposeful Proactive Rounding/Room/bathroom lighting operational, light cord in reach

## 2022-01-26 NOTE — OB PROVIDER TRIAGE NOTE - NSOBPROVIDERNOTE_OBGYN_ALL_OB_FT
A/P: 26y  at 35w2d GA who presents to L&D for "weakness" that she describes as starting in back of head traveling down shoulders, accompanied by blurry, dark vision, shortness of breath, and pressure in ears.     - CBC, BMP wnl  - FS 78  - EKG overnight wnl  - likely hypoglycemic episode 2/2 to lack of po intake since this AM  - Education provided for eating habits, encouraged to continue to eat small frequent meals to prevent hypoglycemic episodes  - Pt can eat, provided with juice and crackers  Dispo: home will f/u with Dr. Ramírez    d/w Dr. Florian

## 2022-01-26 NOTE — ED PROVIDER NOTE - OBJECTIVE STATEMENT
27 yo female presently 35 weeks pregnant presenting to the ER with feeling of lightheadedness and that she felt like she was going to faint x 3, first episode started at 12:12 stating that she felt sweaty and cold and weak ears pop felt weak in shoulders and neck and felt that she was going to pass out,  states that sensation happened 3 times, no acutal syncope no associated cp or sob. reports that first time something like this has ever happened no cardiac hx.   galo 2/20 OBGYMARY peterson   states that monday felt discomfort rectal region, states that she has hemorrhoids, called obgyn was advised to used otc preperahion H, used it, still pressure and discomfort to the rectal region no BRBPR no melena. hx of constipation since gastric bypass. takes daily stool softener =  felt well this past week   states she has been having [place] contractions since last week. no increase of contraction. no vaginal bleeding discharge. no dysuria hematuria. no ACTIVE chest pain sob no abd pain.   family hx of htn no hx of sudden death known no hx of blood clots     no rectal bleeding, no vaginal bleeding   hx of gastric sleeve.         dad HTN

## 2022-01-26 NOTE — ED PROVIDER NOTE - PATIENT PORTAL LINK FT
You can access the FollowMyHealth Patient Portal offered by [facility] by registering at the following website: [email or web address]. By joining [facility]’s FollowMyHealth portal, you will also be able to view your health information using other applications (apps) compatible with our system.

## 2022-01-26 NOTE — ED PROVIDER NOTE - PHYSICAL EXAMINATION
Gen: Well appearing in NAD  Head: NC/AT  Neck: trachea midline  Resp:  No distress CTA   CARD RR  ABD gravid, no abd tenderness  Rectal: thrombosed external hemorrhoid no active bleeding   Ext: no deformities  Neuro:  A&O appears non focal  Skin:  Warm and dry as visualized  Psych:  Normal affect and mood

## 2022-01-26 NOTE — OB RN TRIAGE NOTE - NSNURSINGINSTR_OBGYN_ALL_OB_FT
Please keep all scheduled appointments. Return to hospital if any leaking of fluid, and/or vaginal bleeding. Please call your doctor if you believe you are in  labor and or your symptoms persist.

## 2022-01-26 NOTE — OB RN TRIAGE NOTE - BP NONINVASIVE DIASTOLIC (MM HG)
Pt's daughter being seen at this time, and pt wanted a pregnancy test     Navarro Collier RN  10/03/21 [phone]
[phone]

## 2022-01-26 NOTE — OB PROVIDER TRIAGE NOTE - NSHPPHYSICALEXAM_GEN_ALL_CORE
T(C): 37.1 (01-26-22 @ 16:24), Max: 37.1 (01-26-22 @ 16:24)  HR: 73 (01-26-22 @ 17:07) (69 - 91)  BP: 122/69 (01-26-22 @ 16:24) (101/69 - 133/85)  RR: 18 (01-26-22 @ 16:24) (18 - 18)  SpO2: 100% (01-26-22 @ 17:07) (89% - 100%)  Gen: NAD, well-appearing  Heart: S1 S2, RRR  Lungs: CTAB  Abd: soft, gravid  Ext: non-edematous, non-tender   SVE: deferred  FHT: reactive  [place]: no contractions

## 2022-01-26 NOTE — ED PROVIDER NOTE - NS ED ROS FT
? feeling faint  no syncope   no chest pain no sob   no vaginal bleeding or discharge   no melena BRPR

## 2022-01-26 NOTE — OB RN TRIAGE NOTE - NS_OBGYNHISTORY_OBGYN_ALL_OB_FT
hypothyroid = 75 mcg levothyroxine daily  metformin (prophylactically) 750 mg ? nightly   gastric sleeve 2 yrs ago (lost 150 lbs)

## 2022-01-26 NOTE — ED ADULT TRIAGE NOTE - CHIEF COMPLAINT QUOTE
patient from home states that she felt like she was going to faint and her ears were "going to pop". patient states she is due in february with her first baby, denies any abdominal cramping or pain

## 2022-01-26 NOTE — OB PROVIDER TRIAGE NOTE - HISTORY OF PRESENT ILLNESS
RUFINA FOSTER is a 26y  at 35w2d GA who presents to L&D for "weakness" that she describes as starting in back of head traveling down shoulders, accompanied by blurry, dark vision, shortness of breath, and pressure in ears. She said this is the 3rd time shes had an episode like this, first time occurring prior to her 1h GTT and second yesterday night while she was laying in bed. She was seen in ED yesterday with negative workup for syncope. Pt reports that she had not eaten since this morning, and the other episodes occurred after pt was fasting or had not eaten for several hours. She said she usually eats very frequently small meals due to hx of gastric sleeve. She says she has a hx of vertigo however these episodes feel different than those.    Pt denies headache, palpitations.  Pt denies vaginal bleeding, contractions and leakage of fluid. She endorses good fetal movement. She also reports rectal pain from hemorrhoid when she is stooling but not at rest.    Pregnancy course is uncomplicated.     POB: G1  PGYN: -fibroids/-cysts, denied STD hx, denies abnormal PAPs. PCOS  PMH: hypothyroidism  PSH: sleeve gastrectomy  SH: Denies tobacco use, EtOH use and illicit drug use during the pregnancy; Feels safe at home  Meds: levothyroxine, metformin  All: NKDA

## 2022-01-26 NOTE — OB PROVIDER TRIAGE NOTE - NSHPPHYSICALEXAM_GEN_ALL_CORE
Vital Signs Last 24 Hrs  T(C): 37 (26 Jan 2022 01:00), Max: 37 (26 Jan 2022 01:00)  T(F): 98.6 (26 Jan 2022 01:00), Max: 98.6 (26 Jan 2022 01:00)  HR: 77 (26 Jan 2022 04:20) (69 - 77)  BP: 108/63 (26 Jan 2022 04:20) (108/63 - 133/85)  BP(mean): --  RR: 18 (26 Jan 2022 04:19) (18 - 18)  SpO2: 100% (26 Jan 2022 01:00) (100% - 100%)    Gen: Well appearing, NAD  CV: normal rate and rhythm  Pulm: Normal breath sounds bilaterally  Abd: Soft and gravid  Ext: No tenderness to palpation, no edema    FHT: Baseline FHR 135s, moderate variability, +acels, -decals  [place]: irregular contractions  SVE:  SSE:  Bedside Sono: vertex, anterior placenta Vital Signs Last 24 Hrs  T(C): 37 (26 Jan 2022 01:00), Max: 37 (26 Jan 2022 01:00)  T(F): 98.6 (26 Jan 2022 01:00), Max: 98.6 (26 Jan 2022 01:00)  HR: 77 (26 Jan 2022 04:20) (69 - 77)  BP: 108/63 (26 Jan 2022 04:20) (108/63 - 133/85)  BP(mean): --  RR: 18 (26 Jan 2022 04:19) (18 - 18)  SpO2: 100% (26 Jan 2022 01:00) (100% - 100%)    Gen: Well appearing, NAD  CV: normal rate and rhythm  Pulm: Normal breath sounds bilaterally  Abd: Soft and gravid  Ext: No tenderness to palpation, no edema    FHT: Baseline FHR 135s, moderate variability, +acels, -decals  [place]: irregular contractions  SVE: 0/0/-3  Bedside Sono: vertex, anterior placenta

## 2022-01-26 NOTE — ED PROVIDER NOTE - CLINICAL SUMMARY MEDICAL DECISION MAKING FREE TEXT BOX
feeling faint, vitals stable, no actual syncope.   EKG and fu with L+D   advised on continued management of hemorroids

## 2022-01-26 NOTE — OB RN TRIAGE NOTE - FALL HARM RISK - UNIVERSAL INTERVENTIONS
Bed in lowest position, wheels locked, appropriate side rails in place/Call bell, personal items and telephone in reach/Instruct patient to call for assistance before getting out of bed or chair/Non-slip footwear when patient is out of bed/[place] to call system/Physically safe environment - no spills, clutter or unnecessary equipment/Purposeful Proactive Rounding/Room/bathroom lighting operational, light cord in reach

## 2022-01-26 NOTE — OB PROVIDER TRIAGE NOTE - HISTORY OF PRESENT ILLNESS
26y  at 35w2d presents after an episode of weakness, lightheadedness, chest palpitations, and cold sweats that happened after she sat up in her bed. Patient also reports irregular contractions and a HA that she rates 5/10. Denies VB or LOF. Endorses good FM.    BASIA: 22  LMP: 21    Pregnancy Course:  Hypothyroidism  Pre-diabetes  Anemia  Obesity      OBHx:  G1: current    GYNHx: PCOS. Denies any history of fibroids, ovarian cysts, STIs, or abnormal pap smear    PMH: Hypothyroidism, pre-diabetes, PCOS  PSH: Gastric bypass,   Meds: PNV, iron, Metformin 750mg BID, Levothyroxine 75mcg  All: NKDA  Sx: Denies any use of alcohol tobacco, or illicit drugs during this pregnancy. feels safe at home    Ultrasound: vertex, anterior placenta (1/3/22)  EFW: 2049g (1/3/22) 26y  at 35w2d by LMP presents from the ED after an episode of weakness, lightheadedness, chest palpitations, and cold sweats that happened earlier this evening. Patient was seen in the ED for near syncopal episode. Her EKG showed no abnormalities and she was sent to the ED. Patient states this happened to her once before during her 1 hr glucose test. Now in triage, she feels resolution of symptoms, but is endorsing a HA. Patient also reports irregular cramping. Denies VB or LOF. Endorses good FM.    BASIA: 22  LMP: 21    Pregnancy Course:  Hypothyroidism  Pre-diabetes  Anemia  Obesity    OBHx:  G1: current  GYNHx: PCOS. Denies any history of fibroids, ovarian cysts, STIs, or abnormal pap smear  PMH: Hypothyroidism, pre-diabetes, PCOS  PSH: Gastric sleeve 2020  Meds: PNV, iron, Metformin 750mg BID, Levothyroxine 75mcg  All: NKDA  Sx: Denies any use of alcohol tobacco, or illicit drugs during this pregnancy. feels safe at home    Ultrasound: vertex, anterior placenta (1/3/22)  EFW: 2049g (1/3/22) 26y  at 35w2d by LMP presents from the ED after an episode of weakness, lightheadedness, chest palpitations, and cold sweats that happened earlier this evening. Patient was seen in the ED for near syncopal episode. Her EKG showed no abnormalities and she was sent to the ED. Patient states this happened to her once before during her 1 hr glucose test. Now in triage, she feels resolution of symptoms, but is endorsing a HA. Patient also reports irregular cramping. Denies VB or LOF. Endorses good FM.    BASIA: 22  LMP: 21    Pregnancy Course:  Hypothyroidism  Pre-diabetes  Anemia  Obesity  Hemorrhoids     OBHx:  G1: current  GYNHx: PCOS. Denies any history of fibroids, ovarian cysts, STIs, or abnormal pap smear  PMH: Hypothyroidism, pre-diabetes, PCOS  PSH: Gastric sleeve 2020  Meds: PNV, iron, Metformin 750mg BID, Levothyroxine 75mcg  All: NKDA  Sx: Denies any use of alcohol tobacco, or illicit drugs during this pregnancy. feels safe at home    Ultrasound: vertex, anterior placenta (1/3/22)  EFW: 2049g (1/3/22)

## 2022-01-26 NOTE — OB RN TRIAGE NOTE - CHIEF COMPLAINT QUOTE
light headed while going to bed, weakness around neck and shoulders, had cold sweats. and now I feel a headache "light headed while going to bed, weakness around neck and shoulders, had cold sweats. and now I feel a headache." pt denies any episodes of loss of consciousness.

## 2022-01-27 ENCOUNTER — NON-APPOINTMENT (OUTPATIENT)
Age: 27
End: 2022-01-27

## 2022-01-31 ENCOUNTER — ASOB RESULT (OUTPATIENT)
Age: 27
End: 2022-01-31

## 2022-01-31 ENCOUNTER — APPOINTMENT (OUTPATIENT)
Dept: ANTEPARTUM | Facility: CLINIC | Age: 27
End: 2022-01-31
Payer: MEDICAID

## 2022-01-31 PROBLEM — E03.9 HYPOTHYROIDISM, UNSPECIFIED: Chronic | Status: ACTIVE | Noted: 2022-01-26

## 2022-01-31 PROBLEM — K64.9 UNSPECIFIED HEMORRHOIDS: Chronic | Status: ACTIVE | Noted: 2022-01-26

## 2022-01-31 PROCEDURE — 76819 FETAL BIOPHYS PROFIL W/O NST: CPT

## 2022-01-31 PROCEDURE — 76816 OB US FOLLOW-UP PER FETUS: CPT

## 2022-02-03 ENCOUNTER — NON-APPOINTMENT (OUTPATIENT)
Age: 27
End: 2022-02-03

## 2022-02-04 ENCOUNTER — APPOINTMENT (OUTPATIENT)
Dept: OBGYN | Facility: CLINIC | Age: 27
End: 2022-02-04
Payer: MEDICAID

## 2022-02-04 VITALS
WEIGHT: 223.25 LBS | BODY MASS INDEX: 39.55 KG/M2 | SYSTOLIC BLOOD PRESSURE: 121 MMHG | HEART RATE: 96 BPM | DIASTOLIC BLOOD PRESSURE: 83 MMHG | HEIGHT: 63 IN

## 2022-02-04 PROCEDURE — 0502F SUBSEQUENT PRENATAL CARE: CPT

## 2022-02-05 LAB
HIV1+2 AB SPEC QL IA.RAPID: NONREACTIVE
T PALLIDUM AB SER QL IA: NEGATIVE

## 2022-02-07 ENCOUNTER — ASOB RESULT (OUTPATIENT)
Age: 27
End: 2022-02-07

## 2022-02-07 ENCOUNTER — APPOINTMENT (OUTPATIENT)
Dept: ANTEPARTUM | Facility: CLINIC | Age: 27
End: 2022-02-07
Payer: MEDICAID

## 2022-02-07 LAB
GP B STREP DNA SPEC QL NAA+PROBE: NORMAL
GP B STREP DNA SPEC QL NAA+PROBE: NOT DETECTED
SOURCE GBS: NORMAL

## 2022-02-07 PROCEDURE — 76818 FETAL BIOPHYS PROFILE W/NST: CPT

## 2022-02-08 DIAGNOSIS — Z3A.36 36 WEEKS GESTATION OF PREGNANCY: ICD-10-CM

## 2022-02-08 DIAGNOSIS — Z3A.34 34 WEEKS GESTATION OF PREGNANCY: ICD-10-CM

## 2022-02-10 ENCOUNTER — NON-APPOINTMENT (OUTPATIENT)
Age: 27
End: 2022-02-10

## 2022-02-11 ENCOUNTER — APPOINTMENT (OUTPATIENT)
Dept: OBGYN | Facility: CLINIC | Age: 27
End: 2022-02-11
Payer: MEDICAID

## 2022-02-11 VITALS
WEIGHT: 224 LBS | SYSTOLIC BLOOD PRESSURE: 123 MMHG | DIASTOLIC BLOOD PRESSURE: 85 MMHG | HEIGHT: 63 IN | BODY MASS INDEX: 39.69 KG/M2

## 2022-02-11 PROCEDURE — 0502F SUBSEQUENT PRENATAL CARE: CPT

## 2022-02-14 ENCOUNTER — APPOINTMENT (OUTPATIENT)
Dept: ANTEPARTUM | Facility: CLINIC | Age: 27
End: 2022-02-14
Payer: MEDICAID

## 2022-02-14 ENCOUNTER — ASOB RESULT (OUTPATIENT)
Age: 27
End: 2022-02-14

## 2022-02-14 PROCEDURE — ZZZZZ: CPT

## 2022-02-14 PROCEDURE — 76818 FETAL BIOPHYS PROFILE W/NST: CPT

## 2022-02-17 ENCOUNTER — OUTPATIENT (OUTPATIENT)
Dept: INPATIENT UNIT | Facility: HOSPITAL | Age: 27
LOS: 1 days | End: 2022-02-17
Payer: COMMERCIAL

## 2022-02-17 ENCOUNTER — NON-APPOINTMENT (OUTPATIENT)
Age: 27
End: 2022-02-17

## 2022-02-17 VITALS — SYSTOLIC BLOOD PRESSURE: 121 MMHG | HEART RATE: 63 BPM | DIASTOLIC BLOOD PRESSURE: 63 MMHG

## 2022-02-17 VITALS
TEMPERATURE: 99 F | SYSTOLIC BLOOD PRESSURE: 130 MMHG | HEART RATE: 89 BPM | DIASTOLIC BLOOD PRESSURE: 73 MMHG | RESPIRATION RATE: 18 BRPM

## 2022-02-17 DIAGNOSIS — O47.1 FALSE LABOR AT OR AFTER 37 COMPLETED WEEKS OF GESTATION: ICD-10-CM

## 2022-02-17 DIAGNOSIS — Z90.3 ACQUIRED ABSENCE OF STOMACH [PART OF]: Chronic | ICD-10-CM

## 2022-02-17 PROCEDURE — 59025 FETAL NON-STRESS TEST: CPT

## 2022-02-17 PROCEDURE — 76815 OB US LIMITED FETUS(S): CPT

## 2022-02-17 PROCEDURE — G0463: CPT

## 2022-02-17 PROCEDURE — 99212 OFFICE O/P EST SF 10 MIN: CPT

## 2022-02-17 NOTE — OB RN TRIAGE NOTE - FALL HARM RISK - UNIVERSAL INTERVENTIONS
Bed in lowest position, wheels locked, appropriate side rails in place/Call bell, personal items and telephone in reach/Instruct patient to call for assistance before getting out of bed or chair/Non-slip footwear when patient is out of bed/[place] to call system/Physically safe environment - no spills, clutter or unnecessary equipment/Purposeful Proactive Rounding/Room/bathroom lighting operational, light cord in reach

## 2022-02-17 NOTE — OB PROVIDER TRIAGE NOTE - HISTORY OF PRESENT ILLNESS
25yo  @ 38.3w who comes into L&D with a complaint of contractions about 4 times an hour. Patient denies any vaginal bleeding, loss of fluid, contractions. Reports +FM.     PMH: none  PSH: none  POB: none  PGYN: denies any history of uterine fibroids or ovarian cysts  All: NKDA  Med: PNV

## 2022-02-17 NOTE — OB PROVIDER TRIAGE NOTE - ATTENDING COMMENTS
25yo  @ 38.3w who comes into L&D with a complaint of contractions Q 15 min, found with reassuring fetal testing and no active labor, discharged to home with precautions.

## 2022-02-17 NOTE — OB PROVIDER TRIAGE NOTE - NSOBPROVIDERNOTE_OBGYN_ALL_OB_FT
25yo  @ 38.3w not in labor  - has an appointment with Dr. Ramírez tomorrow  - NST reactive  - d/w Dr. Mir, can be discharged

## 2022-02-17 NOTE — OB PROVIDER TRIAGE NOTE - NSHPPHYSICALEXAM_GEN_ALL_CORE
From: Flash Kelly  To: ANDREA Kaur CNP  Sent: 1/6/2021 11:39 AM EST  Subject: Non-Urgent Medical Question    I'm still concerned that I'm dehydrated. Vital Signs Last 24 Hrs  T(C): 37 (17 Feb 2022 19:50), Max: 37 (17 Feb 2022 19:50)  T(F): 98.6 (17 Feb 2022 19:50), Max: 98.6 (17 Feb 2022 19:50)  HR: 63 (17 Feb 2022 20:34) (63 - 89)  BP: 121/63 (17 Feb 2022 20:34) (121/63 - 130/73)  BP(mean): --  RR: 18 (17 Feb 2022 19:50) (18 - 18)  SpO2: --    Gen: NAD, AAOX3  CV: rrr, s1/s2 present  Lung: CTABL  ABd: soft, gravid  SVE: 0.5/0/-3  Ext: no pain/swelling    FH: reactive  [place]: rare ctx    Sono vertex, ELIZABETH 9.3

## 2022-02-18 ENCOUNTER — APPOINTMENT (OUTPATIENT)
Dept: OBGYN | Facility: CLINIC | Age: 27
End: 2022-02-18
Payer: MEDICAID

## 2022-02-18 VITALS
DIASTOLIC BLOOD PRESSURE: 88 MMHG | BODY MASS INDEX: 39.34 KG/M2 | SYSTOLIC BLOOD PRESSURE: 122 MMHG | HEIGHT: 63 IN | WEIGHT: 222 LBS

## 2022-02-18 DIAGNOSIS — Z3A.37 37 WEEKS GESTATION OF PREGNANCY: ICD-10-CM

## 2022-02-18 PROCEDURE — 0502F SUBSEQUENT PRENATAL CARE: CPT

## 2022-02-19 ENCOUNTER — OUTPATIENT (OUTPATIENT)
Dept: INPATIENT UNIT | Facility: HOSPITAL | Age: 27
LOS: 1 days | End: 2022-02-19
Payer: COMMERCIAL

## 2022-02-19 ENCOUNTER — OUTPATIENT (OUTPATIENT)
Dept: OUTPATIENT SERVICES | Facility: HOSPITAL | Age: 27
LOS: 1 days | End: 2022-02-19
Payer: COMMERCIAL

## 2022-02-19 VITALS
HEART RATE: 89 BPM | DIASTOLIC BLOOD PRESSURE: 86 MMHG | TEMPERATURE: 98 F | SYSTOLIC BLOOD PRESSURE: 131 MMHG | RESPIRATION RATE: 18 BRPM

## 2022-02-19 VITALS — HEART RATE: 67 BPM | DIASTOLIC BLOOD PRESSURE: 80 MMHG | SYSTOLIC BLOOD PRESSURE: 137 MMHG

## 2022-02-19 DIAGNOSIS — Z90.3 ACQUIRED ABSENCE OF STOMACH [PART OF]: Chronic | ICD-10-CM

## 2022-02-19 DIAGNOSIS — O47.1 FALSE LABOR AT OR AFTER 37 COMPLETED WEEKS OF GESTATION: ICD-10-CM

## 2022-02-19 PROCEDURE — 59025 FETAL NON-STRESS TEST: CPT

## 2022-02-19 PROCEDURE — 99213 OFFICE O/P EST LOW 20 MIN: CPT | Mod: GC

## 2022-02-19 PROCEDURE — G0463: CPT

## 2022-02-19 NOTE — OB RN TRIAGE NOTE - FALL HARM RISK - UNIVERSAL INTERVENTIONS
Bed in lowest position, wheels locked, appropriate side rails in place/Call bell, personal items and telephone in reach/Instruct patient to call for assistance before getting out of bed or chair/Non-slip footwear when patient is out of bed/[place] to call system/Physically safe environment - no spills, clutter or unnecessary equipment/Purposeful Proactive Rounding/Room/bathroom lighting operational, light cord in reach

## 2022-02-19 NOTE — OB PROVIDER TRIAGE NOTE - ATTENDING COMMENTS
27yo  @ 38w5d presents to OB triage with irregular contractions. SVE /-3, intact. No contractions on toco; no palpable contractions on abdominal exam. Denies leakage of fluid, vaginal bleeding, discharge, reports good fetal movement. Stable for discharge home with follow up to Dr. Ramírez as scheduled. Labor precautions given. All questions and concerns addressed to her satisfaction.

## 2022-02-19 NOTE — OB RN TRIAGE NOTE - NSICDXPASTSURGICALHX_GEN_ALL_CORE_FT
[address]  [place] [address]  Phone: [phone]  Fax: [phone]                  Brittany Haile   2017 11:00 AM   Office Visit    Descripción:  Female : 1940   Personal Médico:  Wayne Frazier MD   Departamento:  [facility]           Razón de la jono     Fatigue           Diagnósticos de Esta Visita        Comentarios    Pain of upper abdomen    -  Primario     Anxiety         CVD (cerebrovascular disease)         Simple chronic bronchitis         PVD (peripheral vascular disease) with claudication         Chronic kidney disease, stage III (moderate)         Acquired hypothyroidism         Gastroesophageal reflux disease, esophagitis presence not specified         Hyperlipidemia, unspecified hyperlipidemia type         Glucose intolerance (impaired glucose tolerance)         Fatigue, unspecified type                Lista de tareas           Citas próximas        Personal Médico Departamento Tfno del dpto    2017 11:00 AM Wayne Frazier MD [facility] [phone]    2017 9:45 AM Cecil Crooks MD [facility] [phone]      Metas (5 Years of Data)     Ninguna      Follow-Up and Disposition     Return in about 3 months (around 2017), or if symptoms worsen or fail to improve.      Recetas para recoger        Disp Refills Start End    hydrocodone-acetaminophen 5-325mg (NORCO) 5-325 mg per tablet 21 tablet 0 2017     Take 1 tablet by mouth every 8 (eight) hours as needed for Pain. - Oral    [facility]arm[facility]: [facility] [id number] - [place] [address] AT [facility] No. de tlfo: #: [phone]         [facility]     [facility] [place] de Enfermeras - Asistencia   Enfermeras registradas de [facility] pueden ayudarle a reservar keegan jono, proveer educación para la martinez, asesoría clínica, y otros servicios de asesoramiento.   Llame para  preethi servicio gratuito a [phone].             Medicamentos           Mensaje sobre Medicamentos     Verificar los cambios y / o adiciones a merritt régimen de medicación son los mismos que discutir con merritt médico. Si cualquiera de estos cambios o adiciones son incorrectos, por favor notifique a merritt proveedor de atención médica.        EMPEZAR a janet estos medicamentos NUEVOS        Refills    hydrocodone-acetaminophen 5-325mg (NORCO) 5-325 mg per tablet 0    Sig: Take 1 tablet by mouth every 8 (eight) hours as needed for Pain.    Categoría: Print    Vía: Oral      DEJAR de janet estos medicamentos     acetaminophen (TYLENOL) 500 MG tablet Take 2 tablets (1,000 mg total) by mouth 3 (three) times daily as needed for Pain.           Verifique que la siguiente lista de medicamentos es keegan representación exacta de los medicamentos que está tomando actualmente. Si no hay ningunos reportados, la lista puede estar en acevedo. Si no es correcta, por favor póngase en contacto con merritt proveedor de atención médica. Lleve esta lista con usted en rafaela de emergencia.           Medicamentos Actuales     albuterol (PROVENTIL HFA) 90 mcg/actuation inhaler Inhale 2 puffs into the lungs every 4 to 6 hours as needed.    amlodipine (NORVASC) 5 MG tablet Take 2 tablets (10 mg total) by mouth once daily.    dicyclomine (BENTYL) 20 mg tablet Take 1 tablet (20 mg total) by mouth 4 (four) times daily as needed (abdominal pain/spasm).    dipyridamole-aspirin 200-25 mg (AGGRENOX)  mg CM12 Take 1 capsule by mouth 2 (two) times daily.    docusate sodium (COLACE) 100 MG capsule Take 1 capsule (100 mg total) by mouth 2 (two) times daily as needed for Constipation.    DUREZOL 0.05 % Drop ophthalmic solution     escitalopram oxalate (LEXAPRO) 5 MG Tab TAKE 1 TABLET BY MOUTH DAILY    ferrous sulfate 325 mg (65 mg iron) Tab tablet     fluticasone (FLONASE) 50 mcg/actuation nasal spray SPRAY ONCE IN EACH NOSTRIL ONCE DAILY    fluticasone-salmeterol  "250-50 mcg/dose (ADVAIR) 250-50 mcg/dose diskus inhaler Inhale 1 puff into the lungs 2 (two) times daily.    hydrochlorothiazide (MICROZIDE) 12.5 mg capsule Take 1 capsule (12.5 mg total) by mouth once daily.    levothyroxine (SYNTHROID) 88 MCG tablet TAKE 1 TABLET BY MOUTH DAILY BEFORE BREAKFAST    meclizine (ANTIVERT) 25 mg tablet Take 1 tablet (25 mg total) by mouth every 6 (six) hours as needed for Dizziness.    NEXIUM 40 mg capsule Take 1 capsule (40 mg total) by mouth once daily.    NEXIUM 40 mg capsule TAKE 1 CAPSULE BY MOUTH EVERY DAY    olmesartan (BENICAR) 40 MG tablet Take 1 tablet (40 mg total) by mouth once daily.    tiotropium (SPIRIVA WITH HANDIHALER) 18 mcg inhalation capsule Inhale 1 capsule (18 mcg total) into the lungs once daily.    hydrocodone-acetaminophen 5-325mg (NORCO) 5-325 mg per tablet Take 1 tablet by mouth every 8 (eight) hours as needed for Pain.           Información de referencia clínica           Signos vitales - más recientes  Última actualización: 1/18/2017  9:04 AM por Dione Stout LPN    PS Pulso Temperatura [place] SpO2    (!) 122/58 (BP Location: Right arm, Patient Position: Sitting, BP Method: Automatic) 76 98.8 °F (37.1 °C) (Oral) 5' 1" (1.549 m) 64.2 kg (141 lb 8.6 oz) (!) 94%    BMI ([facility])                   26.74 kg/m2           Blood Pressure          Most Recent Value    BP  (!)  122/58      Alergias     A partir del:  1/18/2017        No Known Allergies      Vacunas     Administradas en la fecha de la visita:  1/18/2017        None      Orders Placed During Today's Visit     Exámenes/Procedimientos futuros Se espera el Vence    CBC auto differential  1/18/2017 3/19/2018    Comprehensive metabolic panel  1/18/2017 3/19/2018    CT Abdomen Without Contrast  1/18/2017 1/18/2018    TSH  1/18/2017 3/19/2018    Vitamin B12  1/18/2017 3/19/2018    Vitamin D  1/18/2017 3/19/2018      Registrarse para [facility]     La activación de merritt cuenta [facility] es andersen fácil kwaku " 1-2-3!    1) Ir a [email or web address], seleccione Registrarse Ahora, meter el código de activación y merritt fecha de nacimiento, y seleccione Próximo.    [id number]  Expires: 3/4/2017  9:33 AM      2) Crear un nombre de usuario y contraseña para usar cuando se visita [facility] en el futuro y selecciona keegan pregunta de seguridad en rafaela de que pierda merritt contraseña y seleccione Próximo.    3) Introduzca merritt dirección de correo electrónico y ainsley clic en Registrarse!    Información Adicional  Si tiene alguna pregunta, por favor, e-mail [email or web address] o llame al [phone] para hablar con nuestro personal. Recuerde, [facility] no debe ser usada para necesidades urgentes. En rafaela de emergencia médica, llame al [phone].        Smoking Cessation     Si desea dejar de fumar:  · Usted puede ser elegible para recibir servicios gratuitos si usted es un residente de [place] y comenzó a fumar cigarrillos antes del 1988. Llame al Smoking Cessation Trust (SCT) a [phone] o [phone].   Llame -QUIT-NOW si usted no cumple con los criterios anteriores.                     Brittany Haile   2017 11:00 AM   Office Visit    Description:  Female : 1940   Provider:  Wayne Frazier MD   Department:  [facility]           Reason for Visit     Fatigue           Diagnoses this Visit        Comments    Pain of upper abdomen    -  Primary     Anxiety         CVD (cerebrovascular disease)         Simple chronic bronchitis         PVD (peripheral vascular disease) with claudication         Chronic kidney disease, stage III (moderate)         Acquired hypothyroidism         Gastroesophageal reflux disease, esophagitis presence not specified         Hyperlipidemia, unspecified hyperlipidemia type         Glucose intolerance (impaired glucose tolerance)         Fatigue, unspecified type                To Do List           Future Appointments        Provider Department Dept Phone     1/18/2017 11:00 AM Wayne Frazier MD [facility] [phone]    1/25/2017 9:45 AM Cecil Crooks MD [facility] [phone]      Goals     None      Follow-Up and Disposition     Return in about 3 months (around 4/18/2017), or if symptoms worsen or fail to improve.       These Medications        Disp Refills Start End    hydrocodone-acetaminophen 5-325mg (NORCO) 5-325 mg per tablet 21 tablet 0 1/18/2017     Take 1 tablet by mouth every 8 (eight) hours as needed for Pain. - Oral    Pharmacy: [facility] [address] [place] [address] [place], [place] [address] AT [facility]  #: [phone]         [facility]     [facility] Nurse Care Line - 24/7 Assistance  Registered nurses in the [facility] provide clinical advisement, health education, appointment booking, and other advisory services.  Call for this free service at [phone].             Medications           Message regarding Medications     Verify the changes and/or additions to your medication regime listed below are the same as discussed with your clinician today.  If any of these changes or additions are incorrect, please notify your healthcare provider.        START taking these NEW medications        Refills    hydrocodone-acetaminophen 5-325mg (NORCO) 5-325 mg per tablet 0    Sig: Take 1 tablet by mouth every 8 (eight) hours as needed for Pain.    Class: Print    Route: Oral      STOP taking these medications     acetaminophen (TYLENOL) 500 MG tablet Take 2 tablets (1,000 mg total) by mouth 3 (three) times daily as needed for Pain.           Verify that the below list of medications is an accurate representation of the medications you are currently taking.  If none reported, the list may be blank. If incorrect, please contact your healthcare provider. Carry this list with you in case of emergency.           Current Medications     albuterol (PROVENTIL HFA) 90  mcg/actuation inhaler Inhale 2 puffs into the lungs every 4 to 6 hours as needed.    amlodipine (NORVASC) 5 MG tablet Take 2 tablets (10 mg total) by mouth once daily.    dicyclomine (BENTYL) 20 mg tablet Take 1 tablet (20 mg total) by mouth 4 (four) times daily as needed (abdominal pain/spasm).    dipyridamole-aspirin 200-25 mg (AGGRENOX)  mg CM12 Take 1 capsule by mouth 2 (two) times daily.    docusate sodium (COLACE) 100 MG capsule Take 1 capsule (100 mg total) by mouth 2 (two) times daily as needed for Constipation.    DUREZOL 0.05 % Drop ophthalmic solution     escitalopram oxalate (LEXAPRO) 5 MG Tab TAKE 1 TABLET BY MOUTH DAILY    ferrous sulfate 325 mg (65 mg iron) Tab tablet     fluticasone (FLONASE) 50 mcg/actuation nasal spray SPRAY ONCE IN EACH NOSTRIL ONCE DAILY    fluticasone-salmeterol 250-50 mcg/dose (ADVAIR) 250-50 mcg/dose diskus inhaler Inhale 1 puff into the lungs 2 (two) times daily.    hydrochlorothiazide (MICROZIDE) 12.5 mg capsule Take 1 capsule (12.5 mg total) by mouth once daily.    levothyroxine (SYNTHROID) 88 MCG tablet TAKE 1 TABLET BY MOUTH DAILY BEFORE BREAKFAST    meclizine (ANTIVERT) 25 mg tablet Take 1 tablet (25 mg total) by mouth every 6 (six) hours as needed for Dizziness.    NEXIUM 40 mg capsule Take 1 capsule (40 mg total) by mouth once daily.    NEXIUM 40 mg capsule TAKE 1 CAPSULE BY MOUTH EVERY DAY    olmesartan (BENICAR) 40 MG tablet Take 1 tablet (40 mg total) by mouth once daily.    tiotropium (SPIRIVA WITH HANDIHALER) 18 mcg inhalation capsule Inhale 1 capsule (18 mcg total) into the lungs once daily.    hydrocodone-acetaminophen 5-325mg (NORCO) 5-325 mg per tablet Take 1 tablet by mouth every 8 (eight) hours as needed for Pain.           Clinical Reference Information           Vital Signs - Last Recorded  Most recent update: 1/18/2017  9:04 AM by Dione Stout LPN    BP Pulse Temp Ht Wt SpO2    (!) 122/58 (BP Location: Right arm, Patient Position: Sitting, BP  "Method: Automatic) 76 98.8 °F (37.1 °C) (Oral) 5' 1" (1.549 m) 64.2 kg (141 lb 8.6 oz) (!) 94%    BMI                   26.74 kg/m2           Blood Pressure          Most Recent Value    BP  (!)  122/58      Allergies as of 1/18/2017     No Known Allergies      Immunizations Administered on Date of Encounter - 1/18/2017     None      Orders Placed During Today's Visit     Future Labs/Procedures Expected by Expires    CBC auto differential  1/18/2017 3/19/2018    Comprehensive metabolic panel  1/18/2017 3/19/2018    CT Abdomen Without Contrast  1/18/2017 1/18/2018    TSH  1/18/2017 3/19/2018    Vitamin B12  1/18/2017 3/19/2018    Vitamin D  1/18/2017 3/19/2018      [facility] Sign-Up     Activating your [facility] account is as easy as 1-2-3!     1) Visit [email or web address], select Sign Up Now, enter this activation code and your date of birth, then select Next.  [id number]  Expires: 3/4/2017  9:33 AM      2) Create a username and password to use when you visit [facility] in the future and select a security question in case you lose your password and select Next.    3) Enter your e-mail address and click Sign Up!    Additional Information  If you have questions, please e-mail [email or web address] or call [phone] to talk to our [facility] staff. Remember, [facility] is NOT to be used for urgent needs. For medical emergencies, dial 911.         Smoking Cessation     If you would like to quit smoking:   You may be eligible for free services if you are a [place] resident and started smoking cigarettes before September 1, 1988.  Call the Smoking Cessation Trust (SCT) toll free at [phone] or [phone].   Call 9-368-QUIT-NOW if you do not meet the above criteria.              " PAST SURGICAL HISTORY:  H/O gastric sleeve 2/7/2020

## 2022-02-19 NOTE — OB PROVIDER TRIAGE NOTE - NSOBPROVIDERNOTE_OBGYN_ALL_OB_FT
27yo  @ 38w5d who comes into L&D with a complaint of irregular ctxs  - Vital signs stable  - Cervix is 1cm but soft  - No ctxs noted however may be 2/2 body habitus  - Explained prodromal labor to pt and expected discomfort  - Recommend measures to improve ability to sleep  - Pt expressed understanding. She has an US appt on Tuesday and appt with Dr. Ramírez on Friday  - Stable for d/c    Plan D/w Dr. Emanuel

## 2022-02-19 NOTE — OB PROVIDER TRIAGE NOTE - NSHPPHYSICALEXAM_GEN_ALL_CORE
Vital Signs Last 24 Hrs  T(C): 36.8 (19 Feb 2022 23:30), Max: 36.8 (19 Feb 2022 23:30)  T(F): 98.2 (19 Feb 2022 23:30), Max: 98.2 (19 Feb 2022 23:30)  HR: 67 (19 Feb 2022 23:34) (67 - 89)  BP: 137/80 (19 Feb 2022 23:34) (131/86 - 137/80)  BP(mean): --  RR: 18 (19 Feb 2022 23:30) (18 - 18)  SpO2: --    Physical Exam  General: Alert and oriented x3, NAD  Heart: RRR  Lungs: CTAB  Abd: Soft, nontender, gravid  SVE: 1/50/-3   Sono: VTX  FHT: 120bpm - reactive  [place]: no ctxs noted

## 2022-02-19 NOTE — OB PROVIDER TRIAGE NOTE - HISTORY OF PRESENT ILLNESS
27yo  @ 38w5d who comes into L&D with a complaint of irregular ctxs. She denies any vaginal bleeding, loss of fluid. Normal fetal movement  States she is very uncomfortable with the pregnancy and has not been able to sleep. Ctxs are worse at night.    PMH: Hypothyroidism  PSH: Gastric sleeve 2 years ago  POB: none  All: NKDA  Med: PNV, synthroid

## 2022-02-20 ENCOUNTER — TRANSCRIPTION ENCOUNTER (OUTPATIENT)
Age: 27
End: 2022-02-20

## 2022-02-20 ENCOUNTER — NON-APPOINTMENT (OUTPATIENT)
Age: 27
End: 2022-02-20

## 2022-02-20 ENCOUNTER — INPATIENT (INPATIENT)
Facility: HOSPITAL | Age: 27
LOS: 2 days | Discharge: HOME | End: 2022-02-23
Attending: OBSTETRICS & GYNECOLOGY | Admitting: OBSTETRICS & GYNECOLOGY
Payer: COMMERCIAL

## 2022-02-20 ENCOUNTER — RESULT REVIEW (OUTPATIENT)
Age: 27
End: 2022-02-20

## 2022-02-20 VITALS
HEART RATE: 87 BPM | TEMPERATURE: 99 F | DIASTOLIC BLOOD PRESSURE: 71 MMHG | SYSTOLIC BLOOD PRESSURE: 125 MMHG | RESPIRATION RATE: 18 BRPM

## 2022-02-20 DIAGNOSIS — O47.1 FALSE LABOR AT OR AFTER 37 COMPLETED WEEKS OF GESTATION: ICD-10-CM

## 2022-02-20 DIAGNOSIS — Z90.3 ACQUIRED ABSENCE OF STOMACH [PART OF]: Chronic | ICD-10-CM

## 2022-02-20 LAB
ALBUMIN SERPL ELPH-MCNC: 3.7 G/DL — SIGNIFICANT CHANGE UP (ref 3.3–5.2)
ALP SERPL-CCNC: 202 U/L — HIGH (ref 40–120)
ALT FLD-CCNC: 13 U/L — SIGNIFICANT CHANGE UP
ANION GAP SERPL CALC-SCNC: 12 MMOL/L — SIGNIFICANT CHANGE UP (ref 5–17)
AST SERPL-CCNC: 19 U/L — SIGNIFICANT CHANGE UP
BASOPHILS # BLD AUTO: 0.03 K/UL — SIGNIFICANT CHANGE UP (ref 0–0.2)
BASOPHILS NFR BLD AUTO: 0.3 % — SIGNIFICANT CHANGE UP (ref 0–2)
BILIRUB SERPL-MCNC: 0.3 MG/DL — LOW (ref 0.4–2)
BLD GP AB SCN SERPL QL: SIGNIFICANT CHANGE UP
BUN SERPL-MCNC: 6.9 MG/DL — LOW (ref 8–20)
CALCIUM SERPL-MCNC: 9 MG/DL — SIGNIFICANT CHANGE UP (ref 8.6–10.2)
CHLORIDE SERPL-SCNC: 101 MMOL/L — SIGNIFICANT CHANGE UP (ref 98–107)
CO2 SERPL-SCNC: 22 MMOL/L — SIGNIFICANT CHANGE UP (ref 22–29)
COVID-19 SPIKE DOMAIN AB INTERP: POSITIVE
COVID-19 SPIKE DOMAIN ANTIBODY RESULT: >250 U/ML — HIGH
CREAT SERPL-MCNC: 0.65 MG/DL — SIGNIFICANT CHANGE UP (ref 0.5–1.3)
EOSINOPHIL # BLD AUTO: 0.01 K/UL — SIGNIFICANT CHANGE UP (ref 0–0.5)
EOSINOPHIL NFR BLD AUTO: 0.1 % — SIGNIFICANT CHANGE UP (ref 0–6)
GLUCOSE SERPL-MCNC: 89 MG/DL — SIGNIFICANT CHANGE UP (ref 70–99)
HCT VFR BLD CALC: 36 % — SIGNIFICANT CHANGE UP (ref 34.5–45)
HGB BLD-MCNC: 12.5 G/DL — SIGNIFICANT CHANGE UP (ref 11.5–15.5)
IMM GRANULOCYTES NFR BLD AUTO: 0.3 % — SIGNIFICANT CHANGE UP (ref 0–1.5)
LYMPHOCYTES # BLD AUTO: 1.44 K/UL — SIGNIFICANT CHANGE UP (ref 1–3.3)
LYMPHOCYTES # BLD AUTO: 15.4 % — SIGNIFICANT CHANGE UP (ref 13–44)
MCHC RBC-ENTMCNC: 31 PG — SIGNIFICANT CHANGE UP (ref 27–34)
MCHC RBC-ENTMCNC: 34.7 GM/DL — SIGNIFICANT CHANGE UP (ref 32–36)
MCV RBC AUTO: 89.3 FL — SIGNIFICANT CHANGE UP (ref 80–100)
MONOCYTES # BLD AUTO: 0.75 K/UL — SIGNIFICANT CHANGE UP (ref 0–0.9)
MONOCYTES NFR BLD AUTO: 8 % — SIGNIFICANT CHANGE UP (ref 2–14)
NEUTROPHILS # BLD AUTO: 7.08 K/UL — SIGNIFICANT CHANGE UP (ref 1.8–7.4)
NEUTROPHILS NFR BLD AUTO: 75.9 % — SIGNIFICANT CHANGE UP (ref 43–77)
PLATELET # BLD AUTO: 159 K/UL — SIGNIFICANT CHANGE UP (ref 150–400)
POTASSIUM SERPL-MCNC: 4 MMOL/L — SIGNIFICANT CHANGE UP (ref 3.5–5.3)
POTASSIUM SERPL-SCNC: 4 MMOL/L — SIGNIFICANT CHANGE UP (ref 3.5–5.3)
PROT SERPL-MCNC: 6.7 G/DL — SIGNIFICANT CHANGE UP (ref 6.6–8.7)
RBC # BLD: 4.03 M/UL — SIGNIFICANT CHANGE UP (ref 3.8–5.2)
RBC # FLD: 12.9 % — SIGNIFICANT CHANGE UP (ref 10.3–14.5)
SARS-COV-2 IGG+IGM SERPL QL IA: >250 U/ML — HIGH
SARS-COV-2 IGG+IGM SERPL QL IA: POSITIVE
SARS-COV-2 RNA SPEC QL NAA+PROBE: SIGNIFICANT CHANGE UP
SODIUM SERPL-SCNC: 135 MMOL/L — SIGNIFICANT CHANGE UP (ref 135–145)
WBC # BLD: 9.34 K/UL — SIGNIFICANT CHANGE UP (ref 3.8–10.5)
WBC # FLD AUTO: 9.34 K/UL — SIGNIFICANT CHANGE UP (ref 3.8–10.5)

## 2022-02-20 RX ORDER — LEVOTHYROXINE SODIUM 125 MCG
75 TABLET ORAL DAILY
Refills: 0 | Status: DISCONTINUED | OUTPATIENT
Start: 2022-02-20 | End: 2022-02-23

## 2022-02-20 RX ORDER — CITRIC ACID/SODIUM CITRATE 300-500 MG
30 SOLUTION, ORAL ORAL ONCE
Refills: 0 | Status: COMPLETED | OUTPATIENT
Start: 2022-02-20 | End: 2022-02-21

## 2022-02-20 RX ORDER — OXYTOCIN 10 UNIT/ML
VIAL (ML) INJECTION
Qty: 30 | Refills: 0 | Status: DISCONTINUED | OUTPATIENT
Start: 2022-02-20 | End: 2022-02-21

## 2022-02-20 RX ORDER — OXYTOCIN 10 UNIT/ML
333.33 VIAL (ML) INJECTION
Qty: 20 | Refills: 0 | Status: COMPLETED | OUTPATIENT
Start: 2022-02-20 | End: 2022-02-20

## 2022-02-20 RX ORDER — SODIUM CHLORIDE 9 MG/ML
1000 INJECTION, SOLUTION INTRAVENOUS
Refills: 0 | Status: DISCONTINUED | OUTPATIENT
Start: 2022-02-20 | End: 2022-02-21

## 2022-02-20 RX ADMIN — SODIUM CHLORIDE 125 MILLILITER(S): 9 INJECTION, SOLUTION INTRAVENOUS at 03:45

## 2022-02-20 RX ADMIN — SODIUM CHLORIDE 125 MILLILITER(S): 9 INJECTION, SOLUTION INTRAVENOUS at 20:11

## 2022-02-20 RX ADMIN — Medication 2 MILLIUNIT(S)/MIN: at 08:25

## 2022-02-20 RX ADMIN — SODIUM CHLORIDE 125 MILLILITER(S): 9 INJECTION, SOLUTION INTRAVENOUS at 23:54

## 2022-02-20 RX ADMIN — Medication 75 MICROGRAM(S): at 06:49

## 2022-02-20 NOTE — OB RN PATIENT PROFILE - NS_PRENATALLABSOURCEGBS36PN_OBGYN_ALL_OB
----- Message from Upton Font sent at 7/6/2021 11:26 AM EDT -----  Subject: Refill Request    QUESTIONS  Name of Medication? triamterene-hydroCHLOROthiazide (DYAZIDE) 37.5-25 MG   per capsule  Patient-reported dosage and instructions? 37.5-25 MG take one by mouth   every morning  How many days do you have left? 0  Preferred Pharmacy? [facility] Pharmacy phone number (if available)? [phone]  Additional Information for Provider? PT wants a refill to hold them over   until their medication f/u on 7/23  ---------------------------------------------------------------------------  --------------  CALL BACK INFO  What is the best way for the office to contact you? OK to leave message on   voicemail, OK to respond with electronic message via [employer] portal (only   for patients who have registered [employer] account)  Preferred Call Back Phone Number?  [phone]

## 2022-02-20 NOTE — OB RN DELIVERY SUMMARY - NSSELHIDDEN_OBGYN_ALL_OB_FT
[NS_DeliveryAttending1_OBGYN_ALL_OB_FT:[id number]=],[NS_DeliveryAssist1_OBGYN_ALL_OB_FT:[id number]=],[NS_DeliveryRN_OBGYN_ALL_OB_FT:[id number]=] [NS_DeliveryAttending1_OBGYN_ALL_OB_FT:[id number]=],[NS_DeliveryAssist1_OBGYN_ALL_OB_FT:[id number]=],[NS_DeliveryRN_OBGYN_ALL_OB_FT:[id number]=],[NS_CirculateRN2_OBGYN_ALL_OB_FT:[id number]==],[NS_DeliveryAssist2_OBGYN_ALL_OB_FT:[id number]=],[NS_DeliveryAttending2_OBGYN_ALL_OB_FT:[id number]==]

## 2022-02-20 NOTE — OB PROVIDER H&P - ASSESSMENT
25yo  @39w2d by LMP who comes into L&D in early labor with rupture of membranes at term.     - Cat 1 FHT   - consent  - admission labs  - IV hydration  - continuous toco and fetal heart monitoring  - GBS negative, no abs needed   - pain management: Epidural when desired     Discussed with Dr. Emanuel

## 2022-02-20 NOTE — OB PROVIDER H&P - NSHPPHYSICALEXAM_GEN_ALL_CORE
Vital Signs Last 24 Hrs  T(C): 37.1 (20 Feb 2022 02:52), Max: 37.1 (20 Feb 2022 02:52)  T(F): 98.78 (20 Feb 2022 02:52), Max: 98.78 (20 Feb 2022 02:52)  HR: 87 (20 Feb 2022 03:00) (67 - 89)  BP: 125/71 (20 Feb 2022 03:00) (125/71 - 137/80)  BP(mean): --  RR: 18 (19 Feb 2022 23:30) (18 - 18)  SpO2: --    Gen: NAD  Abd: soft, gravid uterus   Ext: nontender, bilateral nonpitting edema   SSE: moderate pooling of clear fluid noted, no vaginal bleeding seen; Nitrazine positive   SVE: 3/70/-2     FHT: 130 bpm, moderate variability, +accels, -deccels   [place]: q2-3  Sono: vertex

## 2022-02-20 NOTE — OB RN PATIENT PROFILE - FALL HARM RISK - UNIVERSAL INTERVENTIONS
Bed in lowest position, wheels locked, appropriate side rails in place/Call bell, personal items and telephone in reach/Instruct patient to call for assistance before getting out of bed or chair/Non-slip footwear when patient is out of bed/[place] to call system/Physically safe environment - no spills, clutter or unnecessary equipment/Purposeful Proactive Rounding/Room/bathroom lighting operational, light cord in reach

## 2022-02-20 NOTE — OB PROVIDER H&P - HISTORY OF PRESENT ILLNESS
27yo  @39w2d by LMP who comes into L&D with a complaint of leakage of fluid. She was previously seen earlier in the night for irregular contractions. Found to be 1 cm at that time with no regular contraction pattern. She went home and noticed a big gush of fluid around 1:30 am. She states contractions q 15 minutes after that.  She denies any vaginal bleeding. Normal fetal movement. Denies fevers, chills, CP, SOB, N/V or dysuria.     LMP: 21  BASIA: 22    Pregnancy complicated by:  - Hypothyroidism   - Obesity   - Hx of bariatric surgery     POB: none  PGyn: Hx of PCOS, denies fibroids, abnormal PAP smear, or STIs  PMH: Hypothyroidism, pre-diabetic  PSH: Gastric sleeve 2019  All: NKDA  Med: PNV, synthroid 75 mcg qAM, Metformin 750x2 qhs    BMI: 39.68  Sono: vertex, posterior on    EFW: 3147g on     25yo  @39w2d by LMP who comes into L&D with a complaint of leakage of fluid. She was previously seen earlier in the night for irregular contractions. Found to be 1 cm at that time with no regular contraction pattern. She went home and noticed a big gush of fluid around 1:30 am. She states contractions q 15 minutes after that.  She denies any vaginal bleeding. Normal fetal movement. Denies fevers, chills, CP, SOB, N/V or dysuria.     LMP: 21  BASIA: 22    Pregnancy complicated by:  - Hypothyroidism   - Obesity   - Hx of bariatric surgery     POB: none  PGyn: Hx of PCOS, denies fibroids, abnormal PAP smear, or STIs  PMH: Hypothyroidism, pre-diabetic  PSH: Gastric sleeve 2019  All: NKDA  Med: PNV, synthroid 75 mcg qAM, Metformin 750x2 qhs    BMI: 39.68  Sono: vertex, posterior on    EFW: 3147g on

## 2022-02-20 NOTE — OB PROVIDER H&P - NS_MATERNALTRANS_OBGYN_ALL_OB
Infant admitted to [facility] YOSHI YINBENNETT OGDEN  Home Phone: [phone]  No

## 2022-02-20 NOTE — OB PROVIDER H&P - ATTENDING COMMENTS
25yo  @39w2d by LMP admitted for SROM and early labor. Seen earlier this evening for complaint of irregular contractions, /-3, intact. Discharged, when at home reported a large gush of fluids. On exam in triage, 3/70/-2, grossly ruptured; nitrazine positive. Cat I tracing, jacki q2-3minutes. Patient's prenatal records reviewed; admission labs ordered.     R/B/A of admission for labor management, vaginal delivery with possible  section discussed at length, including medications and options for pain management.  She has no Orthodoxy or personal objection to blood transfusion, if necessary.  The patient's questions and concerns were addressed to her satisfaction.  She verbalized understanding of  the plan of care and agrees with recommendations.

## 2022-02-20 NOTE — OB RN DELIVERY SUMMARY - NS_SEPSISRSKCALC_OBGYN_ALL_OB_FT
EOS calculated successfully. EOS Risk Factor: 0.5/1000 live births ([facility] national incidence); GA=39w3d; Temp=99.5; ROM=24.267; GBS='Negative'; Antibiotics='No antibiotics or any antibiotics < 2 hrs prior to birth'

## 2022-02-21 ENCOUNTER — TRANSCRIPTION ENCOUNTER (OUTPATIENT)
Age: 27
End: 2022-02-21

## 2022-02-21 LAB
ALBUMIN SERPL ELPH-MCNC: 2.9 G/DL — LOW (ref 3.3–5.2)
ALP SERPL-CCNC: 161 U/L — HIGH (ref 40–120)
ALT FLD-CCNC: 9 U/L — SIGNIFICANT CHANGE UP
ANION GAP SERPL CALC-SCNC: 14 MMOL/L — SIGNIFICANT CHANGE UP (ref 5–17)
APPEARANCE UR: CLEAR — SIGNIFICANT CHANGE UP
APTT BLD: 27.7 SEC — SIGNIFICANT CHANGE UP (ref 27.5–35.5)
AST SERPL-CCNC: 19 U/L — SIGNIFICANT CHANGE UP
BASOPHILS # BLD AUTO: 0 K/UL — SIGNIFICANT CHANGE UP (ref 0–0.2)
BASOPHILS NFR BLD AUTO: 0 % — SIGNIFICANT CHANGE UP (ref 0–2)
BILIRUB SERPL-MCNC: 0.4 MG/DL — SIGNIFICANT CHANGE UP (ref 0.4–2)
BILIRUB UR-MCNC: NEGATIVE — SIGNIFICANT CHANGE UP
BUN SERPL-MCNC: 9.2 MG/DL — SIGNIFICANT CHANGE UP (ref 8–20)
CALCIUM SERPL-MCNC: 8.2 MG/DL — LOW (ref 8.6–10.2)
CHLORIDE SERPL-SCNC: 102 MMOL/L — SIGNIFICANT CHANGE UP (ref 98–107)
CO2 SERPL-SCNC: 19 MMOL/L — LOW (ref 22–29)
COLOR SPEC: YELLOW — SIGNIFICANT CHANGE UP
COVID-19 SPIKE DOMAIN AB INTERP: POSITIVE
COVID-19 SPIKE DOMAIN ANTIBODY RESULT: >250 U/ML — HIGH
CREAT ?TM UR-MCNC: 45 MG/DL — SIGNIFICANT CHANGE UP
CREAT SERPL-MCNC: 0.94 MG/DL — SIGNIFICANT CHANGE UP (ref 0.5–1.3)
DIFF PNL FLD: ABNORMAL
EOSINOPHIL # BLD AUTO: 0 K/UL — SIGNIFICANT CHANGE UP (ref 0–0.5)
EOSINOPHIL NFR BLD AUTO: 0 % — SIGNIFICANT CHANGE UP (ref 0–6)
EPI CELLS # UR: SIGNIFICANT CHANGE UP
FIBRINOGEN PPP-MCNC: 551 MG/DL — HIGH (ref 290–520)
GIANT PLATELETS BLD QL SMEAR: PRESENT — SIGNIFICANT CHANGE UP
GLUCOSE SERPL-MCNC: 112 MG/DL — HIGH (ref 70–99)
GLUCOSE UR QL: NEGATIVE MG/DL — SIGNIFICANT CHANGE UP
HCT VFR BLD CALC: 33.6 % — LOW (ref 34.5–45)
HGB BLD-MCNC: 11.9 G/DL — SIGNIFICANT CHANGE UP (ref 11.5–15.5)
INR BLD: 1.13 RATIO — SIGNIFICANT CHANGE UP (ref 0.88–1.16)
KETONES UR-MCNC: NEGATIVE — SIGNIFICANT CHANGE UP
LDH SERPL L TO P-CCNC: 266 U/L — HIGH (ref 98–192)
LEUKOCYTE ESTERASE UR-ACNC: NEGATIVE — SIGNIFICANT CHANGE UP
LYMPHOCYTES # BLD AUTO: 0.17 K/UL — LOW (ref 1–3.3)
LYMPHOCYTES # BLD AUTO: 0.9 % — LOW (ref 13–44)
MANUAL SMEAR VERIFICATION: SIGNIFICANT CHANGE UP
MCHC RBC-ENTMCNC: 31.5 PG — SIGNIFICANT CHANGE UP (ref 27–34)
MCHC RBC-ENTMCNC: 35.4 GM/DL — SIGNIFICANT CHANGE UP (ref 32–36)
MCV RBC AUTO: 88.9 FL — SIGNIFICANT CHANGE UP (ref 80–100)
MONOCYTES # BLD AUTO: 0.49 K/UL — SIGNIFICANT CHANGE UP (ref 0–0.9)
MONOCYTES NFR BLD AUTO: 2.6 % — SIGNIFICANT CHANGE UP (ref 2–14)
NEUTROPHILS # BLD AUTO: 18.1 K/UL — HIGH (ref 1.8–7.4)
NEUTROPHILS NFR BLD AUTO: 95.6 % — HIGH (ref 43–77)
NITRITE UR-MCNC: NEGATIVE — SIGNIFICANT CHANGE UP
PH UR: 7 — SIGNIFICANT CHANGE UP (ref 5–8)
PLAT MORPH BLD: NORMAL — SIGNIFICANT CHANGE UP
PLATELET # BLD AUTO: 127 K/UL — LOW (ref 150–400)
POTASSIUM SERPL-MCNC: 4.1 MMOL/L — SIGNIFICANT CHANGE UP (ref 3.5–5.3)
POTASSIUM SERPL-SCNC: 4.1 MMOL/L — SIGNIFICANT CHANGE UP (ref 3.5–5.3)
PROT ?TM UR-MCNC: 10 MG/DL — SIGNIFICANT CHANGE UP (ref 0–12)
PROT SERPL-MCNC: 5.3 G/DL — LOW (ref 6.6–8.7)
PROT UR-MCNC: 15
PROT/CREAT UR-RTO: 0.2 RATIO — SIGNIFICANT CHANGE UP
PROTHROM AB SERPL-ACNC: 13 SEC — SIGNIFICANT CHANGE UP (ref 10.6–13.6)
RBC # BLD: 3.78 M/UL — LOW (ref 3.8–5.2)
RBC # FLD: 12.9 % — SIGNIFICANT CHANGE UP (ref 10.3–14.5)
RBC BLD AUTO: NORMAL — SIGNIFICANT CHANGE UP
RBC CASTS # UR COMP ASSIST: ABNORMAL /HPF (ref 0–4)
SARS-COV-2 IGG+IGM SERPL QL IA: >250 U/ML — HIGH
SARS-COV-2 IGG+IGM SERPL QL IA: POSITIVE
SODIUM SERPL-SCNC: 135 MMOL/L — SIGNIFICANT CHANGE UP (ref 135–145)
SP GR SPEC: 1 — LOW (ref 1.01–1.02)
T PALLIDUM AB TITR SER: NEGATIVE — SIGNIFICANT CHANGE UP
URATE SERPL-MCNC: 5.5 MG/DL — SIGNIFICANT CHANGE UP (ref 2.4–5.7)
UROBILINOGEN FLD QL: NEGATIVE MG/DL — SIGNIFICANT CHANGE UP
VARIANT LYMPHS # BLD: 0.9 % — SIGNIFICANT CHANGE UP (ref 0–6)
WBC # BLD: 18.93 K/UL — HIGH (ref 3.8–10.5)
WBC # FLD AUTO: 18.93 K/UL — HIGH (ref 3.8–10.5)
WBC UR QL: SIGNIFICANT CHANGE UP /HPF (ref 0–5)

## 2022-02-21 PROCEDURE — 59510 CESAREAN DELIVERY: CPT | Mod: U9

## 2022-02-21 PROCEDURE — 88307 TISSUE EXAM BY PATHOLOGIST: CPT | Mod: 26

## 2022-02-21 RX ORDER — CITRIC ACID/SODIUM CITRATE 300-500 MG
30 SOLUTION, ORAL ORAL ONCE
Refills: 0 | Status: DISCONTINUED | OUTPATIENT
Start: 2022-02-21 | End: 2022-02-23

## 2022-02-21 RX ORDER — AZITHROMYCIN 500 MG/1
500 TABLET, FILM COATED ORAL ONCE
Refills: 0 | Status: COMPLETED | OUTPATIENT
Start: 2022-02-21 | End: 2022-02-21

## 2022-02-21 RX ORDER — CEFAZOLIN SODIUM 1 G
3000 VIAL (EA) INJECTION ONCE
Refills: 0 | Status: DISCONTINUED | OUTPATIENT
Start: 2022-02-21 | End: 2022-02-21

## 2022-02-21 RX ORDER — IBUPROFEN 200 MG
600 TABLET ORAL EVERY 6 HOURS
Refills: 0 | Status: DISCONTINUED | OUTPATIENT
Start: 2022-02-21 | End: 2022-02-21

## 2022-02-21 RX ORDER — OXYCODONE HYDROCHLORIDE 5 MG/1
5 TABLET ORAL ONCE
Refills: 0 | Status: DISCONTINUED | OUTPATIENT
Start: 2022-02-21 | End: 2022-02-23

## 2022-02-21 RX ORDER — CEFAZOLIN SODIUM 1 G
2000 VIAL (EA) INJECTION ONCE
Refills: 0 | Status: COMPLETED | OUTPATIENT
Start: 2022-02-21 | End: 2022-02-21

## 2022-02-21 RX ORDER — KETOROLAC TROMETHAMINE 30 MG/ML
30 SYRINGE (ML) INJECTION EVERY 6 HOURS
Refills: 0 | Status: DISCONTINUED | OUTPATIENT
Start: 2022-02-21 | End: 2022-02-22

## 2022-02-21 RX ORDER — SODIUM CHLORIDE 9 MG/ML
1000 INJECTION, SOLUTION INTRAVENOUS
Refills: 0 | Status: DISCONTINUED | OUTPATIENT
Start: 2022-02-21 | End: 2022-02-23

## 2022-02-21 RX ORDER — LANOLIN
1 OINTMENT (GRAM) TOPICAL EVERY 6 HOURS
Refills: 0 | Status: DISCONTINUED | OUTPATIENT
Start: 2022-02-21 | End: 2022-02-23

## 2022-02-21 RX ORDER — ENOXAPARIN SODIUM 100 MG/ML
60 INJECTION SUBCUTANEOUS EVERY 24 HOURS
Refills: 0 | Status: DISCONTINUED | OUTPATIENT
Start: 2022-02-21 | End: 2022-02-23

## 2022-02-21 RX ORDER — TRANEXAMIC ACID 100 MG/ML
1000 INJECTION, SOLUTION INTRAVENOUS ONCE
Refills: 0 | Status: COMPLETED | OUTPATIENT
Start: 2022-02-21 | End: 2022-02-21

## 2022-02-21 RX ORDER — MAGNESIUM HYDROXIDE 400 MG/1
30 TABLET, CHEWABLE ORAL
Refills: 0 | Status: DISCONTINUED | OUTPATIENT
Start: 2022-02-21 | End: 2022-02-23

## 2022-02-21 RX ORDER — FAMOTIDINE 10 MG/ML
20 INJECTION INTRAVENOUS ONCE
Refills: 0 | Status: COMPLETED | OUTPATIENT
Start: 2022-02-21 | End: 2022-02-21

## 2022-02-21 RX ORDER — DIPHENHYDRAMINE HCL 50 MG
25 CAPSULE ORAL EVERY 6 HOURS
Refills: 0 | Status: DISCONTINUED | OUTPATIENT
Start: 2022-02-21 | End: 2022-02-23

## 2022-02-21 RX ORDER — OXYTOCIN 10 UNIT/ML
333.33 VIAL (ML) INJECTION
Qty: 20 | Refills: 0 | Status: DISCONTINUED | OUTPATIENT
Start: 2022-02-21 | End: 2022-02-23

## 2022-02-21 RX ORDER — TETANUS TOXOID, REDUCED DIPHTHERIA TOXOID AND ACELLULAR PERTUSSIS VACCINE, ADSORBED 5; 2.5; 8; 8; 2.5 [IU]/.5ML; [IU]/.5ML; UG/.5ML; UG/.5ML; UG/.5ML
0.5 SUSPENSION INTRAMUSCULAR ONCE
Refills: 0 | Status: DISCONTINUED | OUTPATIENT
Start: 2022-02-21 | End: 2022-02-23

## 2022-02-21 RX ORDER — OXYCODONE HYDROCHLORIDE 5 MG/1
5 TABLET ORAL
Refills: 0 | Status: DISCONTINUED | OUTPATIENT
Start: 2022-02-21 | End: 2022-02-23

## 2022-02-21 RX ORDER — SIMETHICONE 80 MG/1
80 TABLET, CHEWABLE ORAL EVERY 4 HOURS
Refills: 0 | Status: DISCONTINUED | OUTPATIENT
Start: 2022-02-21 | End: 2022-02-23

## 2022-02-21 RX ORDER — ACETAMINOPHEN 500 MG
975 TABLET ORAL
Refills: 0 | Status: DISCONTINUED | OUTPATIENT
Start: 2022-02-21 | End: 2022-02-23

## 2022-02-21 RX ADMIN — AZITHROMYCIN 255 MILLIGRAM(S): 500 TABLET, FILM COATED ORAL at 01:03

## 2022-02-21 RX ADMIN — TRANEXAMIC ACID 220 MILLIGRAM(S): 100 INJECTION, SOLUTION INTRAVENOUS at 01:35

## 2022-02-21 RX ADMIN — Medication 30 MILLIGRAM(S): at 18:01

## 2022-02-21 RX ADMIN — Medication 975 MILLIGRAM(S): at 15:30

## 2022-02-21 RX ADMIN — Medication 30 MILLIGRAM(S): at 17:46

## 2022-02-21 RX ADMIN — FAMOTIDINE 20 MILLIGRAM(S): 10 INJECTION INTRAVENOUS at 00:35

## 2022-02-21 RX ADMIN — ENOXAPARIN SODIUM 60 MILLIGRAM(S): 100 INJECTION SUBCUTANEOUS at 14:49

## 2022-02-21 RX ADMIN — Medication 975 MILLIGRAM(S): at 14:49

## 2022-02-21 RX ADMIN — Medication 975 MILLIGRAM(S): at 21:59

## 2022-02-21 RX ADMIN — Medication 1000 MILLIUNIT(S)/MIN: at 02:47

## 2022-02-21 RX ADMIN — Medication 30 MILLIGRAM(S): at 03:34

## 2022-02-21 RX ADMIN — Medication 30 MILLILITER(S): at 00:35

## 2022-02-21 RX ADMIN — Medication 975 MILLIGRAM(S): at 10:15

## 2022-02-21 RX ADMIN — Medication 100 MILLIGRAM(S): at 01:15

## 2022-02-21 RX ADMIN — Medication 975 MILLIGRAM(S): at 09:16

## 2022-02-21 RX ADMIN — Medication 30 MILLIGRAM(S): at 12:08

## 2022-02-21 RX ADMIN — Medication 75 MICROGRAM(S): at 05:10

## 2022-02-21 RX ADMIN — Medication 1000 MILLIUNIT(S)/MIN: at 01:46

## 2022-02-21 RX ADMIN — Medication 30 MILLIGRAM(S): at 12:23

## 2022-02-21 NOTE — OB PROVIDER DELIVERY SUMMARY - NSPROVIDERDELIVERYNOTE_OBGYN_ALL_OB_FT
27yo G1 presenting with SROM, labor.   Patient was taken to the OR, a primary low transverse  section was performed for arrest of descent and a viable female infant was delivered atraumatically in cephalic presentation at 0146, no nuchal cord. Placenta delivered at 0146. Hysterotomy, peritoneum, fascia, subcutaneous tissue were reapproximated with suture. Skin with insorbs. Excellent hemostasis was obtained at each layer of closure.  Apgars 3/8/9  EBL 1014cc. 25yo G1 presenting with SROM, labor.   Patient was taken to the OR, a primary low transverse  section was performed for arrest of descent and a viable female infant was delivered atraumatically with vaginal assistance in cephalic presentation at 0146, no nuchal cord. Placenta delivered at 0146. Hysterotomy, peritoneum, fascia, subcutaneous tissue were reapproximated with suture. Skin with insorb. Excellent hemostasis was obtained at each layer of closure.  Apgars 3/8/9  EBL 1014cc.

## 2022-02-21 NOTE — CHART NOTE - NSCHARTNOTEFT_GEN_A_CORE
Risks, benefits, and alternatives of continuing with labor augmentation vs primary  section with any indicated procedures discussed at length, including risk of injury to adjacent organs, such as the bladder, bowel, and bilateral ureters, risk of infection, and risk of hemorrhage, which may lead to subsequent intervention and possible blood transfusion.  She has no Adventism or personal objection to blood transfusion, if necessary.  She was given the opportunity to ask questions and all were addressed.  She wishes to proceed with  section.  She understands the plan of care.  Neonatology and Anesthesia to be notified.     Dony Nascimento, DO

## 2022-02-21 NOTE — DISCHARGE NOTE OB - MATERIALS PROVIDED
New Beginnings/Vaccinations/[facility] [place] Screening Program/  Immunization Record/Breastfeeding Log/Breastfeeding Mother’s Support Group Information/Guide to Postpartum Care/[facility] Hearing Screen Program/Back To Sleep Handout/Shaken Baby Prevention Handout/Breastfeeding Guide and Packet/Birth Certificate Instructions

## 2022-02-21 NOTE — DISCHARGE NOTE OB - CARE PROVIDER_API CALL
David Ramírez)  Obstetrics and Gynecology  [address], [address]  [place] [address]  Phone: [phone]  Fax: [phone]  Follow Up Time:

## 2022-02-21 NOTE — OB PROVIDER DELIVERY SUMMARY - NSCSDELIVAASS_OBGYN_ALL_OB
If BP is elevated may take extra 1/2 Losartan (25mg).  Take regular pain medication as directed by PCP.   EKG normal x2  Troponin negative x2  
N/A

## 2022-02-21 NOTE — DISCHARGE NOTE OB - PLAN OF CARE
Please call your provider to schedule postoperative wound check visit in 2 weeks. Take medications as directed, regular diet, activity as tolerated. Exclusive breast feeding for the first 6 months is recommended. Nothing per vagina for 6 weeks (incl. sex, douching, etc). If you have additional concerns, please inform your provider.

## 2022-02-21 NOTE — DISCHARGE NOTE OB - MEDICATION SUMMARY - MEDICATIONS TO TAKE
I will START or STAY ON the medications listed below when I get home from the hospital:    acetaminophen 325 mg oral tablet  -- 3 tab(s) by mouth every 6 hours  -- Indication: For pp pain    Lovenox 60 mg/0.6 mL injectable solution  -- 60 milligram(s) subcutaneously once a day   -- It is very important that you take or use this exactly as directed.  Do not skip doses or discontinue unless directed by your doctor.    -- Indication: For pp    metFORMIN 750 mg oral tablet, extended release  -- 2 tab(s) by mouth once a day (at bedtime)  -- Indication: For home    PNV Prenatal oral tablet  -- 1 tab(s) by mouth once a day  -- Indication: For pp    levothyroxine  -- 75 microgram(s) by mouth once a day  -- Indication: For home

## 2022-02-21 NOTE — DISCHARGE NOTE OB - PATIENT PORTAL LINK FT
You can access the FollowMyHealth Patient Portal offered by [facility] by registering at the following website: [email or web address]. By joining [employer]’s FollowMyHealth portal, you will also be able to view your health information using other applications (apps) compatible with our system.

## 2022-02-21 NOTE — OB NEONATOLOGY/PEDIATRICIAN DELIVERY SUMMARY - NSPEDSNEONOTESA_OBGYN_ALL_OB_FT
Baby stunned at birth. Started PPV at 30 seconds after birth, max Fio2 40% to CPAP and then to Room air by 5 minutes of age.

## 2022-02-21 NOTE — OB RN INTRAOPERATIVE NOTE - NSSELHIDDEN_OBGYN_ALL_OB_FT
[NS_DeliveryAttending1_OBGYN_ALL_OB_FT:[email or web address]=],[NS_DeliveryRN_OBGYN_ALL_OB_FT:[id number]=]

## 2022-02-21 NOTE — OB PROVIDER DELIVERY SUMMARY - NSSELHIDDEN_OBGYN_ALL_OB_FT
[NS_DeliveryAttending1_OBGYN_ALL_OB_FT:[email or web address]=],[NS_DeliveryRN_OBGYN_ALL_OB_FT:[id number]=] [NS_DeliveryAttending1_OBGYN_ALL_OB_FT:[email or web address]=],[NS_DeliveryRN_OBGYN_ALL_OB_FT:[id number]=],[NS_DeliveryAttending2_OBGYN_ALL_OB_FT:[id number]==]

## 2022-02-21 NOTE — DISCHARGE NOTE OB - NS MD DC FALL RISK RISK
For information on Fall & Injury Prevention, visit: [email or web address] OR  [email or web address] OR  https://www.cdc.gov/steadi/patient.html

## 2022-02-21 NOTE — DISCHARGE NOTE OB - CARE PLAN
1 Principal Discharge DX:	S/P  section  Assessment and plan of treatment:	Please call your provider to schedule postoperative wound check visit in 2 weeks. Take medications as directed, regular diet, activity as tolerated. Exclusive breast feeding for the first 6 months is recommended. Nothing per vagina for 6 weeks (incl. sex, douching, etc). If you have additional concerns, please inform your provider.

## 2022-02-21 NOTE — DISCHARGE NOTE OB - HOSPITAL COURSE
Patient underwent a  section secondary to arrest of descent. She had a stable postpartum hemorrhage. Post-partum course was otherwise uncomplicated. Pain is well controlled with PRN medication. She has no difficulty with ambulation, voiding, or PO intake. Lab values and vital signs are stable prior to discharge.

## 2022-02-22 ENCOUNTER — APPOINTMENT (OUTPATIENT)
Dept: ANTEPARTUM | Facility: CLINIC | Age: 27
End: 2022-02-22

## 2022-02-22 LAB
BASOPHILS # BLD AUTO: 0.04 K/UL — SIGNIFICANT CHANGE UP (ref 0–0.2)
BASOPHILS NFR BLD AUTO: 0.2 % — SIGNIFICANT CHANGE UP (ref 0–2)
EOSINOPHIL # BLD AUTO: 0 K/UL — SIGNIFICANT CHANGE UP (ref 0–0.5)
EOSINOPHIL NFR BLD AUTO: 0 % — SIGNIFICANT CHANGE UP (ref 0–6)
HCT VFR BLD CALC: 30.2 % — LOW (ref 34.5–45)
HGB BLD-MCNC: 10.4 G/DL — LOW (ref 11.5–15.5)
IMM GRANULOCYTES NFR BLD AUTO: 0.8 % — SIGNIFICANT CHANGE UP (ref 0–1.5)
LYMPHOCYTES # BLD AUTO: 1.77 K/UL — SIGNIFICANT CHANGE UP (ref 1–3.3)
LYMPHOCYTES # BLD AUTO: 10.3 % — LOW (ref 13–44)
MCHC RBC-ENTMCNC: 31 PG — SIGNIFICANT CHANGE UP (ref 27–34)
MCHC RBC-ENTMCNC: 34.4 GM/DL — SIGNIFICANT CHANGE UP (ref 32–36)
MCV RBC AUTO: 90.1 FL — SIGNIFICANT CHANGE UP (ref 80–100)
MONOCYTES # BLD AUTO: 1.46 K/UL — HIGH (ref 0–0.9)
MONOCYTES NFR BLD AUTO: 8.5 % — SIGNIFICANT CHANGE UP (ref 2–14)
NEUTROPHILS # BLD AUTO: 13.82 K/UL — HIGH (ref 1.8–7.4)
NEUTROPHILS NFR BLD AUTO: 80.2 % — HIGH (ref 43–77)
PLATELET # BLD AUTO: 132 K/UL — LOW (ref 150–400)
RBC # BLD: 3.35 M/UL — LOW (ref 3.8–5.2)
RBC # FLD: 13.1 % — SIGNIFICANT CHANGE UP (ref 10.3–14.5)
WBC # BLD: 17.22 K/UL — HIGH (ref 3.8–10.5)
WBC # FLD AUTO: 17.22 K/UL — HIGH (ref 3.8–10.5)

## 2022-02-22 RX ORDER — OXYCODONE HYDROCHLORIDE 5 MG/1
5 TABLET ORAL ONCE
Refills: 0 | Status: DISCONTINUED | OUTPATIENT
Start: 2022-02-22 | End: 2022-02-23

## 2022-02-22 RX ADMIN — Medication 975 MILLIGRAM(S): at 10:00

## 2022-02-22 RX ADMIN — Medication 975 MILLIGRAM(S): at 14:38

## 2022-02-22 RX ADMIN — Medication 975 MILLIGRAM(S): at 03:22

## 2022-02-22 RX ADMIN — Medication 975 MILLIGRAM(S): at 20:55

## 2022-02-22 RX ADMIN — Medication 30 MILLIGRAM(S): at 00:03

## 2022-02-22 RX ADMIN — ENOXAPARIN SODIUM 60 MILLIGRAM(S): 100 INJECTION SUBCUTANEOUS at 14:12

## 2022-02-22 RX ADMIN — Medication 75 MICROGRAM(S): at 05:31

## 2022-02-22 RX ADMIN — Medication 975 MILLIGRAM(S): at 09:07

## 2022-02-22 RX ADMIN — Medication 975 MILLIGRAM(S): at 15:15

## 2022-02-22 NOTE — PROGRESS NOTE ADULT - SUBJECTIVE AND OBJECTIVE BOX
RUFINA FOSTER is a 26y  now POD#1 s/p primary  section @39w2d GA 2/2 arrest of descent, received TXA, was a stable code H.    S:    No acute events overnight.   The patient has no complaints.  Pain controlled with current treatment regimen.   She is ambulating without difficulty and tolerating PO.   + flatus/-BM/+ voiding   She endorses appropriate lochia, which is decreasing.   She is breastfeeding and providing formula to baby.  She denies fevers, chills, nausea and vomiting.   She denies lightheadedness, dizziness, palpitations, chest pain and SOB.     O:    T(C): 36.6 (22 @ 00:11), Max: 36.9 (22 @ 15:24)  HR: 79 (22 @ 00:11) (52 - 88)  BP: 116/77 (22 @ 00:11) (112/74 - 148/93)  RR: 16 (22 @ 00:11) (16 - 18)  SpO2: 99% (22 @ 00:11) (97% - 100%)    Gen: NAD, AOx3  Resp: breathing comfortably on RA  Abdomen:  Soft, non-tender, non-distended  Incision: Clean/dry/intact with insorb in place. pressure dressing removed   Uterus:  Fundus firm below umbilicus  VE:  Lochia as expected  LE:  Non-tender and non-edematous                          11.9   18.93 )-----------( 127      ( 2022 04:45 )             33.6         135  |  102  |  9.2  ----------------------------<  112<H>  4.1   |  19.0<L>  |  0.94    Ca    8.2<L>      2022 04:45    TPro  5.3<L>  /  Alb  2.9<L>  /  TBili  0.4  /  DBili  x   /  AST  19  /  ALT  9   /  AlkPhos  161<H>

## 2022-02-22 NOTE — PROGRESS NOTE ADULT - ASSESSMENT
A/P:  RUFINA FOSTER is a 26y  now POD#1 s/p primary  section @39w2d GA 2/2 arrest of descent, received TXA, was a stable code H.    -Vital signs stable  -Hgb: 11.9 -> AM labs pending   -Voiding, tolerating PO  -Advance care as tolerated   -VTE ppx: encourage ambulation, SCDs while in bed, daily lovenox  -Continue routine postpartum and postoperative care and education  -Healthy female infant  -Dispo: Patient to be discharged when meeting all postpartum and postoperative milestones and pending attending approval.

## 2022-02-23 VITALS
RESPIRATION RATE: 18 BRPM | OXYGEN SATURATION: 95 % | DIASTOLIC BLOOD PRESSURE: 77 MMHG | HEART RATE: 66 BPM | SYSTOLIC BLOOD PRESSURE: 119 MMHG | TEMPERATURE: 98 F

## 2022-02-23 PROCEDURE — 86901 BLOOD TYPING SEROLOGIC RH(D): CPT

## 2022-02-23 PROCEDURE — 80053 COMPREHEN METABOLIC PANEL: CPT

## 2022-02-23 PROCEDURE — 85610 PROTHROMBIN TIME: CPT

## 2022-02-23 PROCEDURE — 59025 FETAL NON-STRESS TEST: CPT

## 2022-02-23 PROCEDURE — 86769 SARS-COV-2 COVID-19 ANTIBODY: CPT

## 2022-02-23 PROCEDURE — 88307 TISSUE EXAM BY PATHOLOGIST: CPT

## 2022-02-23 PROCEDURE — U0003: CPT

## 2022-02-23 PROCEDURE — 83615 LACTATE (LD) (LDH) ENZYME: CPT

## 2022-02-23 PROCEDURE — U0005: CPT

## 2022-02-23 PROCEDURE — 81001 URINALYSIS AUTO W/SCOPE: CPT

## 2022-02-23 PROCEDURE — 85025 COMPLETE CBC W/AUTO DIFF WBC: CPT

## 2022-02-23 PROCEDURE — 84550 ASSAY OF BLOOD/URIC ACID: CPT

## 2022-02-23 PROCEDURE — 86850 RBC ANTIBODY SCREEN: CPT

## 2022-02-23 PROCEDURE — 85384 FIBRINOGEN ACTIVITY: CPT

## 2022-02-23 PROCEDURE — 84156 ASSAY OF PROTEIN URINE: CPT

## 2022-02-23 PROCEDURE — 82570 ASSAY OF URINE CREATININE: CPT

## 2022-02-23 PROCEDURE — G0463: CPT

## 2022-02-23 PROCEDURE — 85730 THROMBOPLASTIN TIME PARTIAL: CPT

## 2022-02-23 PROCEDURE — 86780 TREPONEMA PALLIDUM: CPT

## 2022-02-23 PROCEDURE — 86900 BLOOD TYPING SEROLOGIC ABO: CPT

## 2022-02-23 PROCEDURE — 36415 COLL VENOUS BLD VENIPUNCTURE: CPT

## 2022-02-23 PROCEDURE — 59050 FETAL MONITOR W/REPORT: CPT

## 2022-02-23 RX ORDER — ACETAMINOPHEN 500 MG
3 TABLET ORAL
Qty: 0 | Refills: 0 | DISCHARGE
Start: 2022-02-23

## 2022-02-23 RX ORDER — ENOXAPARIN SODIUM 100 MG/ML
60 INJECTION SUBCUTANEOUS
Qty: 8.4 | Refills: 0
Start: 2022-02-23 | End: 2022-03-08

## 2022-02-23 RX ADMIN — Medication 975 MILLIGRAM(S): at 02:01

## 2022-02-23 RX ADMIN — ENOXAPARIN SODIUM 60 MILLIGRAM(S): 100 INJECTION SUBCUTANEOUS at 11:29

## 2022-02-23 RX ADMIN — Medication 75 MICROGRAM(S): at 05:20

## 2022-02-23 RX ADMIN — Medication 975 MILLIGRAM(S): at 09:41

## 2022-02-23 NOTE — PROGRESS NOTE ADULT - SUBJECTIVE AND OBJECTIVE BOX
RUFINA FOSTER is a 26y  now POD#2 s/p primary  section @39w2d GA 2/2 arrest of descent, received TXA, was a stable code H.    S:    No acute events overnight.   The patient has no complaints.  Pain controlled with current treatment regimen.   She is ambulating without difficulty and tolerating PO.   + flatus/-BM/+ voiding   She endorses appropriate lochia, which is decreasing.   She is breastfeeding baby.  She denies fevers, chills, nausea and vomiting.   She denies lightheadedness, dizziness, palpitations, chest pain and SOB.     O:    Vital Signs Last 24 Hrs  T(C): 36.7 (2022 04:22), Max: 36.8 (2022 15:47)  T(F): 98.1 (2022 04:22), Max: 98.2 (2022 15:47)  HR: 74 (2022 04:22) (70 - 74)  BP: 118/82 (2022 04:22) (116/77 - 118/82)  RR: 18 (2022 04:22) (18 - 18)  SpO2: 100% (2022 04:22) (100% - 100%)    Gen: NAD, AOx3  Resp: breathing comfortably on RA  Abdomen:  Soft, non-tender, non-distended  Incision: Clean/dry/intact with insorb in place.   Uterus:  Fundus firm below umbilicus  VE:  Lochia as expected  LE:  Non-tender and non-edematous                          10.4   17.22 )-----------( 132      ( 2022 06:24 )             30.2

## 2022-02-23 NOTE — PROGRESS NOTE ADULT - ASSESSMENT
A/P:  RUFINA FOSTER is a 26y  now POD#2 s/p primary  section @39w2d GA 2/2 arrest of descent, received TXA, was a stable code H.    -Vital signs stable  -Hgb: 11.9 -> 10.4  -Voiding, tolerating PO  -Advance care as tolerated   -VTE ppx: encourage ambulation, SCDs while in bed, daily lovenox  -Continue routine postpartum and postoperative care and education  -Healthy female infant  -Dispo: Patient likely dc today, pending attending approval.

## 2022-02-24 ENCOUNTER — NON-APPOINTMENT (OUTPATIENT)
Age: 27
End: 2022-02-24

## 2022-02-24 DIAGNOSIS — Z34.93 ENCOUNTER FOR SUPERVISION OF NORMAL PREGNANCY, UNSPECIFIED, THIRD TRIMESTER: ICD-10-CM

## 2022-02-24 DIAGNOSIS — R73.09 OTHER ABNORMAL GLUCOSE: ICD-10-CM

## 2022-02-24 DIAGNOSIS — Z87.898 PERSONAL HISTORY OF OTHER SPECIFIED CONDITIONS: ICD-10-CM

## 2022-02-25 ENCOUNTER — APPOINTMENT (OUTPATIENT)
Dept: OBGYN | Facility: CLINIC | Age: 27
End: 2022-02-25

## 2022-02-28 ENCOUNTER — APPOINTMENT (OUTPATIENT)
Dept: OBGYN | Facility: CLINIC | Age: 27
End: 2022-02-28
Payer: MEDICAID

## 2022-02-28 VITALS
SYSTOLIC BLOOD PRESSURE: 157 MMHG | BODY MASS INDEX: 38.52 KG/M2 | WEIGHT: 217.38 LBS | HEART RATE: 54 BPM | HEIGHT: 63 IN | DIASTOLIC BLOOD PRESSURE: 84 MMHG

## 2022-02-28 PROCEDURE — 0503F POSTPARTUM CARE VISIT: CPT

## 2022-02-28 NOTE — PHYSICAL EXAM
[Appropriately responsive] : appropriately responsive [Alert] : alert [No Acute Distress] : no acute distress [No Lymphadenopathy] : no lymphadenopathy [Regular Rate Rhythm] : regular rate rhythm [No Murmurs] : no murmurs [Clear to Auscultation B/L] : clear to auscultation bilaterally [Soft] : soft [Non-tender] : non-tender [Non-distended] : non-distended [No HSM] : No HSM [No Lesions] : no lesions [No Mass] : no mass [Oriented x3] : oriented x3 [FreeTextEntry7] : abdominal incision is healing well. [Examination Of The Breasts] : a normal appearance [Normal] : normal [No Masses] : no breast masses were palpable [Uterine Adnexae] : normal

## 2022-03-11 LAB — SURGICAL PATHOLOGY STUDY: SIGNIFICANT CHANGE UP

## 2022-03-21 NOTE — OB RN PATIENT PROFILE - PRO PRENATAL LABS ORI SOURCE HBSAG
[facility] Call Center    Phone Message    May a detailed message be left on voicemail: yes     Reason for Call: Other: Pt son called in to get pt scheduled within the next two weeks after most recent ED visit and the next appt  is able to set is 6/01. Please reach out to the pt son. Thank you.     Action Taken: Message routed to:  [facility] ([facility]): aaron hep    Travel Screening: Not Applicable                                                                       hard copy, drawn during this pregnancy

## 2022-04-04 ENCOUNTER — APPOINTMENT (OUTPATIENT)
Dept: OBGYN | Facility: CLINIC | Age: 27
End: 2022-04-04
Payer: MEDICAID

## 2022-04-04 VITALS
HEIGHT: 63 IN | WEIGHT: 200 LBS | SYSTOLIC BLOOD PRESSURE: 133 MMHG | DIASTOLIC BLOOD PRESSURE: 87 MMHG | BODY MASS INDEX: 35.44 KG/M2

## 2022-04-04 DIAGNOSIS — Z98.891 HISTORY OF UTERINE SCAR FROM PREVIOUS SURGERY: ICD-10-CM

## 2022-04-04 PROCEDURE — 99214 OFFICE O/P EST MOD 30 MIN: CPT

## 2022-04-04 RX ORDER — ETONOGESTREL AND ETHINYL ESTRADIOL 11.7; 2.7 MG/1; MG/1
0.12-0.015 INSERT, EXTENDED RELEASE VAGINAL
Qty: 1 | Refills: 3 | Status: ACTIVE | COMMUNITY
Start: 2022-04-04 | End: 1900-01-01

## 2022-04-05 LAB
C TRACH RRNA SPEC QL NAA+PROBE: NOT DETECTED
N GONORRHOEA RRNA SPEC QL NAA+PROBE: NOT DETECTED
SOURCE TP AMPLIFICATION: NORMAL

## 2022-04-09 LAB — CYTOLOGY CVX/VAG DOC THIN PREP: NORMAL

## 2022-04-27 ENCOUNTER — TRANSCRIPTION ENCOUNTER (OUTPATIENT)
Age: 27
End: 2022-04-27

## 2022-05-25 NOTE — OB RN TRIAGE NOTE - NS_NPO_OBGYN_ALL_OB_DT
For information on Fall & Injury Prevention, visit: [email or web address] OR  [email or web address] OR  https://www.cdc.gov/steadi/patient.html 26-Jan-2022 14:00

## 2022-07-18 ENCOUNTER — APPOINTMENT (OUTPATIENT)
Dept: OBGYN | Facility: CLINIC | Age: 27
End: 2022-07-18

## 2022-07-18 VITALS
BODY MASS INDEX: 36.33 KG/M2 | WEIGHT: 205.06 LBS | SYSTOLIC BLOOD PRESSURE: 120 MMHG | HEIGHT: 63 IN | DIASTOLIC BLOOD PRESSURE: 80 MMHG

## 2022-07-18 DIAGNOSIS — K64.9 UNSPECIFIED HEMORRHOIDS: ICD-10-CM

## 2022-07-18 PROCEDURE — 99213 OFFICE O/P EST LOW 20 MIN: CPT

## 2022-07-29 ENCOUNTER — APPOINTMENT (OUTPATIENT)
Dept: OBGYN | Facility: CLINIC | Age: 27
End: 2022-07-29

## 2022-07-29 VITALS
WEIGHT: 200.7 LBS | HEIGHT: 63 IN | SYSTOLIC BLOOD PRESSURE: 122 MMHG | BODY MASS INDEX: 35.56 KG/M2 | DIASTOLIC BLOOD PRESSURE: 80 MMHG

## 2022-07-29 PROCEDURE — 99213 OFFICE O/P EST LOW 20 MIN: CPT

## 2022-07-29 RX ORDER — METRONIDAZOLE 7.5 MG/G
0.75 GEL VAGINAL
Qty: 1 | Refills: 1 | Status: ACTIVE | COMMUNITY
Start: 2022-07-29 | End: 1900-01-01

## 2022-08-01 LAB
CANDIDA VAG CYTO: DETECTED
G VAGINALIS+PREV SP MTYP VAG QL MICRO: NOT DETECTED
T VAGINALIS VAG QL WET PREP: NOT DETECTED

## 2022-08-01 RX ORDER — FLUCONAZOLE 150 MG/1
150 TABLET ORAL
Qty: 2 | Refills: 1 | Status: ACTIVE | COMMUNITY
Start: 2022-08-01 | End: 1900-01-01

## 2022-08-19 NOTE — OB PROVIDER LABOR PROGRESS NOTE - ASSESSMENT
A/P  -exam unchanged  -patient counseled on possible interventions moving forward, including continuing with labor and  section.  -
A/P    -IUPC placed to monitor contractions, pitocin @16  -c/w current management
A/P  -minimal cervical change  -rufina in place, will reassess contraction pattern and possible pitocin
A/P  -c/w current management
A/P    -c/w current mangement
- - -

## 2022-12-01 ENCOUNTER — APPOINTMENT (OUTPATIENT)
Dept: PULMONOLOGY | Facility: CLINIC | Age: 27
End: 2022-12-01

## 2022-12-01 VITALS — HEIGHT: 63.5 IN | BODY MASS INDEX: 37.27 KG/M2 | WEIGHT: 213 LBS

## 2022-12-01 VITALS
HEART RATE: 92 BPM | DIASTOLIC BLOOD PRESSURE: 74 MMHG | SYSTOLIC BLOOD PRESSURE: 122 MMHG | RESPIRATION RATE: 16 BRPM | OXYGEN SATURATION: 97 %

## 2022-12-01 DIAGNOSIS — Z48.89 ENCOUNTER FOR OTHER SPECIFIED SURGICAL AFTERCARE: ICD-10-CM

## 2022-12-01 DIAGNOSIS — Z01.811 ENCOUNTER FOR PREPROCEDURAL RESPIRATORY EXAMINATION: ICD-10-CM

## 2022-12-01 PROCEDURE — 85018 HEMOGLOBIN: CPT | Mod: QW

## 2022-12-01 PROCEDURE — 99214 OFFICE O/P EST MOD 30 MIN: CPT | Mod: 25

## 2022-12-01 PROCEDURE — 94010 BREATHING CAPACITY TEST: CPT

## 2022-12-01 PROCEDURE — 94729 DIFFUSING CAPACITY: CPT

## 2022-12-01 PROCEDURE — 94727 GAS DIL/WSHOT DETER LNG VOL: CPT

## 2022-12-01 RX ORDER — LEVOTHYROXINE SODIUM 0.07 MG/1
75 TABLET ORAL
Qty: 90 | Refills: 0 | Status: ACTIVE | COMMUNITY
Start: 2022-11-15

## 2022-12-01 RX ORDER — HYDROCORTISONE 25 MG/G
2.5 CREAM TOPICAL
Qty: 30 | Refills: 0 | Status: ACTIVE | COMMUNITY
Start: 2022-07-05

## 2022-12-01 RX ORDER — NAPROXEN 500 MG/1
500 TABLET ORAL
Qty: 60 | Refills: 0 | Status: ACTIVE | COMMUNITY
Start: 2022-07-05

## 2022-12-01 RX ORDER — ERGOCALCIFEROL 1.25 MG/1
1.25 MG CAPSULE, LIQUID FILLED ORAL
Qty: 4 | Refills: 0 | Status: DISCONTINUED | COMMUNITY
Start: 2018-01-11 | End: 2022-12-01

## 2022-12-01 RX ORDER — ONDANSETRON 4 MG/1
4 TABLET, ORALLY DISINTEGRATING ORAL
Qty: 14 | Refills: 2 | Status: DISCONTINUED | COMMUNITY
Start: 2021-07-02 | End: 2022-12-01

## 2022-12-01 RX ORDER — METFORMIN ER 750 MG 750 MG/1
750 TABLET ORAL
Qty: 180 | Refills: 0 | Status: ACTIVE | COMMUNITY
Start: 2021-12-22

## 2022-12-01 NOTE — REASON FOR VISIT
[Follow-Up] : a follow-up visit [Shortness of Breath] : shortness of breath [Pre-op Risk Stratification] : pre-op risk stratification [Obesity] : obesity

## 2022-12-01 NOTE — REVIEW OF SYSTEMS
Alicia Doty  [facility]  [facility] [address]  Scheduled Appointment: 08/08/2022    [facility]  [facility] [address]  Scheduled Appointment: 08/09/2022    [facility]  [facility] [address]  Scheduled Appointment: 08/10/2022    [facility]  [facility] [address]  Scheduled Appointment: 08/11/2022    [facility]  [facility] [address]  Scheduled Appointment: 08/12/2022     [facility]  [facility] [address]  Scheduled Appointment: 08/09/2022    [facility]  [facility] [address]  Scheduled Appointment: 08/10/2022    [facility]  [facility] [address]  Scheduled Appointment: 08/11/2022    [facility]  [facility] [address]  Scheduled Appointment: 08/12/2022     [Seasonal Allergies] : seasonal allergies [GERD] : gerd [Thyroid Problem] : thyroid problem [Negative] : Psychiatric

## 2022-12-10 ENCOUNTER — OUTPATIENT (OUTPATIENT)
Dept: OUTPATIENT SERVICES | Facility: HOSPITAL | Age: 27
LOS: 1 days | End: 2022-12-10
Payer: COMMERCIAL

## 2022-12-10 ENCOUNTER — APPOINTMENT (OUTPATIENT)
Dept: RADIOLOGY | Facility: CLINIC | Age: 27
End: 2022-12-10

## 2022-12-10 DIAGNOSIS — Z90.3 ACQUIRED ABSENCE OF STOMACH [PART OF]: Chronic | ICD-10-CM

## 2022-12-10 DIAGNOSIS — Z01.811 ENCOUNTER FOR PREPROCEDURAL RESPIRATORY EXAMINATION: ICD-10-CM

## 2022-12-10 PROCEDURE — 71046 X-RAY EXAM CHEST 2 VIEWS: CPT

## 2022-12-10 PROCEDURE — 71046 X-RAY EXAM CHEST 2 VIEWS: CPT | Mod: 26

## 2022-12-13 NOTE — HISTORY OF PRESENT ILLNESS
[Never] : never [None] : No associated symptoms are reported [Good Compliance] : good compliance with treatment [Good Tolerance] : good tolerance of treatment [Good Symptom Control] : good symptom control [Follow-Up - Routine Clinic] : a routine clinic follow-up of [Excess Weight] : excess weight [Currently Experiencing] : The patient is currently experiencing symptoms. [Dyspnea] : dyspnea [Knee Pain] : knee pain [Low Calorie Diet] : low calorie diet [Fair Compliance] : fair compliance with treatment [Fair Tolerance] : fair tolerance of treatment [Poor Symptom Control] : poor symptom control [Sleep Apnea] : sleep apnea [Hypertension] : hypertension [High] : high [Low Calorie] : low calorie [Well Balanced Diet] : well balanced meals [___ Times/Week] : exercises [unfilled] times per week [Aerobic Conditioning] : aerobic conditioning [TextBox_4] : Patient c/o SOBOE but is otherwise without associated respiratory complaints. This has significantly improved with weight loss.\par The patient reports having sleeve gastrectomy in 2/2020 and is now being evaluated for gastric bypass surgery though did lose >150lbs with the gastric sleeve surgery.\par She reports changes in smell and taste in 3/2020. She did not get tested but report +IgG c/w prior covid infection.\par The patient presents for pulmonary evaluation prior to her upcoming bariatric surgery. \par  [FreeTextEntry1] : \par  [de-identified] : AutoCPAP

## 2022-12-13 NOTE — PROCEDURE
[FreeTextEntry1] : PFTs 9/3/19 - Mild restriction on spirometry with normal lung volumes and moderately reduced diffusion capacity which corrected for alveolar volume.\par PFTs 9/24/20 - normal spirometry and diffusion capacity but mildly reduced lung volumes which is slightly worse than previous.\par PFTs 12/1/22 - Normal spirometry with mildly reduced lung volumes and normal DLCO; pt reports having difficulty with the maneuvers and gave variable effort\par

## 2022-12-13 NOTE — CONSULT LETTER
[Dear  ___] : Dear  [unfilled], [Consult Letter:] : I had the pleasure of evaluating your patient, [unfilled]. [Please see my note below.] : Please see my note below. [Consult Closing:] : Thank you very much for allowing me to participate in the care of this patient.  If you have any questions, please do not hesitate to contact me. [Sincerely,] : Sincerely, [DrYana  ___] : Dr. WALKER [FreeTextEntry3] : Rajeev Pandya MD, FCCP, D. ABSM\par Pulmonary and Sleep Medicine\par [facility] [place]

## 2022-12-13 NOTE — DISCUSSION/SUMMARY
[FreeTextEntry1] : \par #1. PFTs performed previously revealed mild restriction on spirometry but with near normal lung volumes and moderately reduced diffusion capacity which corrected for alveolar volume. PFTs now are essentially normal but with reduced lung volumes of unclear etiology or significance but may be due to difficulty with maneuvers and variable effort\par #2. The patient does not appear to require chronic BD therapy at this time\par #3. SOBOE is likely related to weight or deconditioning given restriction\par #4. Diet and exercise for weight loss\par #5. She has lost 150 lbs since her surgery\par #6. D/c'd CPAP given essential resolution of ANNAMARIA on HST with weight loss; pt denies sleep complaints\par #7. Pre-op CXR\par #8. Provided the CXR is unremarkable, there is no pulmonary contraindication for the patient's upcoming bariatric surgery. I would recommend post op incentive spirometry, GI/DVT prophylaxis as needed, early ambulation as able, and adequate pain control. Would recommend that O2 saturation should be monitored during and after the procedure. \par #9. Reviewed risks of exposure and symptoms of Covid-19 virus, including how the virus is spread and precautions to avoid jacki virus. \par \par Addendum 12/13/22:\par CXR was unremarkable so pt could proceed with bariatric surgery without significant pulmonary contraindication

## 2022-12-13 NOTE — PHYSICAL EXAM
[No Acute Distress] : no acute distress [Normal Appearance] : normal appearance [Supple] : supple [Normal Rate/Rhythm] : normal rate/rhythm [Normal S1, S2] : normal s1, s2 [No Murmurs] : no murmurs [No Resp Distress] : no resp distress [No Acc Muscle Use] : no acc muscle use [Normal Rhythm and Effort] : normal rhythm and effort [Clear to Auscultation Bilaterally] : clear to auscultation bilaterally [No Abnormalities] : no abnormalities [Benign] : benign [Not Tender] : not tender [Soft] : soft [No Clubbing] : no clubbing [No Edema] : no edema [No Focal Deficits] : no focal deficits [Oriented x3] : oriented x3 [TextBox_2] : Pt is obese

## 2023-01-18 NOTE — OB RN TRIAGE NOTE - FALL HARM RISK - PATIENT NEEDS ASSISTANCE
No assistance needed Complex Repair And Dorsal Nasal Flap Text: The defect edges were debeveled with a #15 scalpel blade.  The primary defect was closed partially with a complex linear closure.  Given the location of the remaining defect, shape of the defect and the proximity to free margins a dorsal nasal flap was deemed most appropriate for complete closure of the defect.  Using a sterile surgical marker, an appropriate flap was drawn incorporating the defect and placing the expected incisions within the relaxed skin tension lines where possible.    The area thus outlined was incised deep to adipose tissue with a #15 scalpel blade.  The skin margins were undermined to an appropriate distance in all directions utilizing iris scissors.

## 2023-02-14 ENCOUNTER — APPOINTMENT (OUTPATIENT)
Dept: OBGYN | Facility: CLINIC | Age: 28
End: 2023-02-14
Payer: MEDICAID

## 2023-02-14 VITALS
BODY MASS INDEX: 38.46 KG/M2 | SYSTOLIC BLOOD PRESSURE: 120 MMHG | DIASTOLIC BLOOD PRESSURE: 82 MMHG | WEIGHT: 219.8 LBS | HEIGHT: 63.5 IN

## 2023-02-14 PROCEDURE — 99214 OFFICE O/P EST MOD 30 MIN: CPT

## 2023-03-30 ENCOUNTER — EMERGENCY (EMERGENCY)
Facility: HOSPITAL | Age: 28
LOS: 1 days | Discharge: HOME | End: 2023-03-30
Attending: EMERGENCY MEDICINE
Payer: COMMERCIAL

## 2023-03-30 VITALS
SYSTOLIC BLOOD PRESSURE: 139 MMHG | RESPIRATION RATE: 16 BRPM | WEIGHT: 199.08 LBS | OXYGEN SATURATION: 98 % | HEART RATE: 97 BPM | TEMPERATURE: 98 F | DIASTOLIC BLOOD PRESSURE: 85 MMHG

## 2023-03-30 DIAGNOSIS — Z90.3 ACQUIRED ABSENCE OF STOMACH [PART OF]: Chronic | ICD-10-CM

## 2023-03-30 LAB
ALBUMIN SERPL ELPH-MCNC: 4 G/DL — SIGNIFICANT CHANGE UP (ref 3.3–5.2)
ALP SERPL-CCNC: 59 U/L — SIGNIFICANT CHANGE UP (ref 40–120)
ALT FLD-CCNC: 25 U/L — SIGNIFICANT CHANGE UP
ANION GAP SERPL CALC-SCNC: 13 MMOL/L — SIGNIFICANT CHANGE UP (ref 5–17)
AST SERPL-CCNC: 16 U/L — SIGNIFICANT CHANGE UP
BASOPHILS # BLD AUTO: 0.03 K/UL — SIGNIFICANT CHANGE UP (ref 0–0.2)
BASOPHILS NFR BLD AUTO: 0.3 % — SIGNIFICANT CHANGE UP (ref 0–2)
BILIRUB SERPL-MCNC: 0.3 MG/DL — LOW (ref 0.4–2)
BUN SERPL-MCNC: 12.3 MG/DL — SIGNIFICANT CHANGE UP (ref 8–20)
CALCIUM SERPL-MCNC: 8.6 MG/DL — SIGNIFICANT CHANGE UP (ref 8.4–10.5)
CHLORIDE SERPL-SCNC: 107 MMOL/L — SIGNIFICANT CHANGE UP (ref 96–108)
CO2 SERPL-SCNC: 23 MMOL/L — SIGNIFICANT CHANGE UP (ref 22–29)
CREAT SERPL-MCNC: 0.54 MG/DL — SIGNIFICANT CHANGE UP (ref 0.5–1.3)
EGFR: 128 ML/MIN/1.73M2 — SIGNIFICANT CHANGE UP
EOSINOPHIL # BLD AUTO: 0 K/UL — SIGNIFICANT CHANGE UP (ref 0–0.5)
EOSINOPHIL NFR BLD AUTO: 0 % — SIGNIFICANT CHANGE UP (ref 0–6)
GLUCOSE SERPL-MCNC: 86 MG/DL — SIGNIFICANT CHANGE UP (ref 70–99)
HCG SERPL-ACNC: <4 MIU/ML — SIGNIFICANT CHANGE UP
HCT VFR BLD CALC: 39.2 % — SIGNIFICANT CHANGE UP (ref 34.5–45)
HGB BLD-MCNC: 13.8 G/DL — SIGNIFICANT CHANGE UP (ref 11.5–15.5)
IMM GRANULOCYTES NFR BLD AUTO: 0.2 % — SIGNIFICANT CHANGE UP (ref 0–0.9)
LIDOCAIN IGE QN: 161 U/L — HIGH (ref 22–51)
LYMPHOCYTES # BLD AUTO: 1.85 K/UL — SIGNIFICANT CHANGE UP (ref 1–3.3)
LYMPHOCYTES # BLD AUTO: 20.5 % — SIGNIFICANT CHANGE UP (ref 13–44)
MAGNESIUM SERPL-MCNC: 1.6 MG/DL — SIGNIFICANT CHANGE UP (ref 1.6–2.6)
MCHC RBC-ENTMCNC: 30.6 PG — SIGNIFICANT CHANGE UP (ref 27–34)
MCHC RBC-ENTMCNC: 35.2 GM/DL — SIGNIFICANT CHANGE UP (ref 32–36)
MCV RBC AUTO: 86.9 FL — SIGNIFICANT CHANGE UP (ref 80–100)
MONOCYTES # BLD AUTO: 0.78 K/UL — SIGNIFICANT CHANGE UP (ref 0–0.9)
MONOCYTES NFR BLD AUTO: 8.7 % — SIGNIFICANT CHANGE UP (ref 2–14)
NEUTROPHILS # BLD AUTO: 6.33 K/UL — SIGNIFICANT CHANGE UP (ref 1.8–7.4)
NEUTROPHILS NFR BLD AUTO: 70.3 % — SIGNIFICANT CHANGE UP (ref 43–77)
PLATELET # BLD AUTO: 231 K/UL — SIGNIFICANT CHANGE UP (ref 150–400)
POTASSIUM SERPL-MCNC: 3.9 MMOL/L — SIGNIFICANT CHANGE UP (ref 3.5–5.3)
POTASSIUM SERPL-SCNC: 3.9 MMOL/L — SIGNIFICANT CHANGE UP (ref 3.5–5.3)
PROT SERPL-MCNC: 6.6 G/DL — SIGNIFICANT CHANGE UP (ref 6.6–8.7)
RBC # BLD: 4.51 M/UL — SIGNIFICANT CHANGE UP (ref 3.8–5.2)
RBC # FLD: 12.5 % — SIGNIFICANT CHANGE UP (ref 10.3–14.5)
SODIUM SERPL-SCNC: 143 MMOL/L — SIGNIFICANT CHANGE UP (ref 135–145)
WBC # BLD: 9.01 K/UL — SIGNIFICANT CHANGE UP (ref 3.8–10.5)
WBC # FLD AUTO: 9.01 K/UL — SIGNIFICANT CHANGE UP (ref 3.8–10.5)

## 2023-03-30 PROCEDURE — 99284 EMERGENCY DEPT VISIT MOD MDM: CPT

## 2023-03-30 PROCEDURE — 83690 ASSAY OF LIPASE: CPT

## 2023-03-30 PROCEDURE — 74177 CT ABD & PELVIS W/CONTRAST: CPT | Mod: MA

## 2023-03-30 PROCEDURE — 84702 CHORIONIC GONADOTROPIN TEST: CPT

## 2023-03-30 PROCEDURE — 36415 COLL VENOUS BLD VENIPUNCTURE: CPT

## 2023-03-30 PROCEDURE — 83735 ASSAY OF MAGNESIUM: CPT

## 2023-03-30 PROCEDURE — 96361 HYDRATE IV INFUSION ADD-ON: CPT

## 2023-03-30 PROCEDURE — 96374 THER/PROPH/DIAG INJ IV PUSH: CPT | Mod: XU

## 2023-03-30 PROCEDURE — 85025 COMPLETE CBC W/AUTO DIFF WBC: CPT

## 2023-03-30 PROCEDURE — 80053 COMPREHEN METABOLIC PANEL: CPT

## 2023-03-30 PROCEDURE — 74177 CT ABD & PELVIS W/CONTRAST: CPT | Mod: 26,MA

## 2023-03-30 PROCEDURE — 99284 EMERGENCY DEPT VISIT MOD MDM: CPT | Mod: 25

## 2023-03-30 RX ORDER — IOHEXOL 300 MG/ML
30 INJECTION, SOLUTION INTRAVENOUS ONCE
Refills: 0 | Status: COMPLETED | OUTPATIENT
Start: 2023-03-30 | End: 2023-03-30

## 2023-03-30 RX ORDER — ONDANSETRON 8 MG/1
4 TABLET, FILM COATED ORAL ONCE
Refills: 0 | Status: COMPLETED | OUTPATIENT
Start: 2023-03-30 | End: 2023-03-30

## 2023-03-30 RX ORDER — SODIUM CHLORIDE 9 MG/ML
1000 INJECTION, SOLUTION INTRAVENOUS ONCE
Refills: 0 | Status: COMPLETED | OUTPATIENT
Start: 2023-03-30 | End: 2023-03-30

## 2023-03-30 RX ADMIN — SODIUM CHLORIDE 1000 MILLILITER(S): 9 INJECTION, SOLUTION INTRAVENOUS at 20:00

## 2023-03-30 RX ADMIN — ONDANSETRON 4 MILLIGRAM(S): 8 TABLET, FILM COATED ORAL at 20:00

## 2023-03-30 RX ADMIN — IOHEXOL 30 MILLILITER(S): 300 INJECTION, SOLUTION INTRAVENOUS at 20:00

## 2023-03-30 NOTE — ED PROVIDER NOTE - PATIENT PORTAL LINK FT
You can access the FollowMyHealth Patient Portal offered by [facility] by registering at the following website: [email or web address]. By joining [facility]’s FollowMyHealth portal, you will also be able to view your health information using other applications (apps) compatible with our system.

## 2023-03-30 NOTE — ED PROVIDER NOTE - OBJECTIVE STATEMENT
27 year old female 15 days post-op from gastric bypass presents with palpitations. Pt states that the episodes have occurred 4 times since her surgery, the worst today. She states that shortly after she eats she gets palpitations, lightheaded and nausea. no abd pain, chest pain, vomiting, diarrhea, fevers, chills.

## 2023-03-30 NOTE — ED ADULT TRIAGE NOTE - CHIEF COMPLAINT QUOTE
Pt c/o gen weakness and palpitations s/p gastric bypass surgery 15 days ago and reports having new medications that she thinks may be why.

## 2023-03-30 NOTE — ED PROVIDER NOTE - CLINICAL SUMMARY MEDICAL DECISION MAKING FREE TEXT BOX
Abd soft, non-tender. Pt is well appearign and currently asymptomatic. I suspect this to be a mild case of dumping syndrome post-gastric bypass. Will check labs, CT, PO challenege. None Abd soft, non-tender. Pt is well appearing and currently asymptomatic. I suspect this to be a mild case of dumping syndrome post-gastric bypass. Pt states she is currently asymptomatic, abd soft and non-tender, feels well and tolerated PO, labs and CT reviewed, stable for dc and follow up with he rbaroatric surgeon

## 2023-03-30 NOTE — ED PROVIDER NOTE - CARE PLAN
1 Principal Discharge DX:	Nausea and vomiting   Glycopyrrolate Counseling:  I discussed with the patient the risks of glycopyrrolate including but not limited to skin rash, drowsiness, dry mouth, difficulty urinating, and blurred vision.

## 2023-06-22 ENCOUNTER — APPOINTMENT (OUTPATIENT)
Dept: OBGYN | Facility: CLINIC | Age: 28
End: 2023-06-22
Payer: MEDICAID

## 2023-06-22 VITALS
DIASTOLIC BLOOD PRESSURE: 80 MMHG | WEIGHT: 197 LBS | BODY MASS INDEX: 34.91 KG/M2 | HEIGHT: 63 IN | SYSTOLIC BLOOD PRESSURE: 120 MMHG

## 2023-06-22 DIAGNOSIS — E66.01 MORBID (SEVERE) OBESITY DUE TO EXCESS CALORIES: ICD-10-CM

## 2023-06-22 DIAGNOSIS — N89.8 OTHER SPECIFIED NONINFLAMMATORY DISORDERS OF VAGINA: ICD-10-CM

## 2023-06-22 PROCEDURE — 99214 OFFICE O/P EST MOD 30 MIN: CPT

## 2023-06-22 RX ORDER — TERCONAZOLE 4 MG/G
0.4 CREAM VAGINAL DAILY
Qty: 1 | Refills: 3 | Status: ACTIVE | COMMUNITY
Start: 2021-12-10 | End: 1900-01-01

## 2023-06-22 NOTE — PHYSICAL EXAM
[Chaperone Present] : A chaperone was present in the examining room during all aspects of the physical examination [FreeTextEntry1] : Opal [Appropriately responsive] : appropriately responsive [Alert] : alert [No Acute Distress] : no acute distress [No Lymphadenopathy] : no lymphadenopathy [Regular Rate Rhythm] : regular rate rhythm [No Murmurs] : no murmurs [Clear to Auscultation B/L] : clear to auscultation bilaterally [Soft] : soft [Non-tender] : non-tender [Non-distended] : non-distended [No HSM] : No HSM [No Lesions] : no lesions [No Mass] : no mass [Oriented x3] : oriented x3 [Examination Of The Breasts] : a normal appearance [No Masses] : no breast masses were palpable [Labia Majora] : normal [Labia Minora] : normal [Normal] : normal [Uterine Adnexae] : normal

## 2023-06-22 NOTE — HISTORY OF PRESENT ILLNESS
[Intravaginal Ring] : uses the intravaginal ring [Y] : Positive pregnancy history [Regular Cycle Intervals] : periods have been regular [Frequency: Q ___ days] : menstrual periods occur approximately every [unfilled] days [Menarche Age: ____] : age at menarche was [unfilled] [PGxTotal] : 1 [[facility]] : 1 [PGHxPremature] : 0 [PGHxAbortions] : 0 [[facility] Centeriving] : 1 [PGHxABInduced] : 0 [PGHxABSpont] : 0 [PGHxEctopic] : 0 [PGHxMultBirths] : 0

## 2023-06-27 LAB — CYTOLOGY CVX/VAG DOC THIN PREP: NORMAL

## 2023-09-28 NOTE — OB RN PATIENT PROFILE - FUNCTIONAL ASSESSMENT - BASIC MOBILITY PT AGE POP HIDDEN
Adult Hydroquinone Counseling:  Patient advised that medication may result in skin irritation, lightening (hypopigmentation), dryness, and burning.  In the event of skin irritation, the patient was advised to reduce the amount of the drug applied or use it less frequently.  Rarely, spots that are treated with hydroquinone can become darker (pseudoochronosis).  Should this occur, patient instructed to stop medication and call the office. The patient verbalized understanding of the proper use and possible adverse effects of hydroquinone.  All of the patient's questions and concerns were addressed.

## 2024-01-10 ENCOUNTER — APPOINTMENT (OUTPATIENT)
Dept: OBGYN | Facility: CLINIC | Age: 29
End: 2024-01-10
Payer: MEDICAID

## 2024-01-10 VITALS
HEIGHT: 63 IN | WEIGHT: 181.6 LBS | SYSTOLIC BLOOD PRESSURE: 118 MMHG | BODY MASS INDEX: 32.18 KG/M2 | DIASTOLIC BLOOD PRESSURE: 84 MMHG

## 2024-01-10 DIAGNOSIS — Z30.9 ENCOUNTER FOR CONTRACEPTIVE MANAGEMENT, UNSPECIFIED: ICD-10-CM

## 2024-01-10 PROCEDURE — 99214 OFFICE O/P EST MOD 30 MIN: CPT

## 2024-01-10 RX ORDER — ETONOGESTREL AND ETHINYL ESTRADIOL .12; .015 MG/D; MG/D
0.12-0.015 RING VAGINAL
Qty: 3 | Refills: 3 | Status: ACTIVE | COMMUNITY
Start: 2022-07-18 | End: 1900-01-01

## 2024-01-10 NOTE — DISCUSSION/SUMMARY
[FreeTextEntry1] : 28-year-old G1, P1 with history of gastric bypass surgery presently using NuvaRing for contraception with no complaints.  NuvaRing renewed.  Return in 6 months for Pap smear.

## 2024-01-10 NOTE — HISTORY OF PRESENT ILLNESS
[FreeTextEntry1] : 28-year-old -0-0-1 last menstrual cycle was 2024 presents for GYN evaluation.  She had gastric bypass and has lost a lot of weight.  She is presently on NuvaRing for contraception and denies severe headaches.  Complains of occasional lower abdominal discomfort.

## 2024-02-12 NOTE — DISCHARGE NOTE OB - HAS THE PATIENT RECEIVED THE INFLUENZA VACCINE THIS SEASON?
Patient Specific Counseling (Will Not Stick From Patient To Patient): Patient uses old spice deodorant. Advised to switch to dove after rash resolves. Detail Level: Detailed no...

## 2024-07-24 ENCOUNTER — APPOINTMENT (OUTPATIENT)
Dept: OBGYN | Facility: CLINIC | Age: 29
End: 2024-07-24
Payer: COMMERCIAL

## 2024-07-24 VITALS
DIASTOLIC BLOOD PRESSURE: 64 MMHG | WEIGHT: 201 LBS | BODY MASS INDEX: 35.61 KG/M2 | SYSTOLIC BLOOD PRESSURE: 100 MMHG | HEIGHT: 63 IN

## 2024-07-24 DIAGNOSIS — N91.1 SECONDARY AMENORRHEA: ICD-10-CM

## 2024-07-24 LAB
HCG UR QL: POSITIVE
QUALITY CONTROL: YES

## 2024-07-24 PROCEDURE — 81025 URINE PREGNANCY TEST: CPT

## 2024-07-24 PROCEDURE — 99214 OFFICE O/P EST MOD 30 MIN: CPT | Mod: 25

## 2024-07-24 PROCEDURE — 99395 PREV VISIT EST AGE 18-39: CPT

## 2024-07-24 PROCEDURE — 99459 PELVIC EXAMINATION: CPT

## 2024-07-24 RX ORDER — ONDANSETRON 4 MG/1
4 TABLET ORAL
Qty: 3 | Refills: 2 | Status: ACTIVE | COMMUNITY
Start: 2024-07-24 | End: 1900-01-01

## 2024-07-24 NOTE — PHYSICAL EXAM
[Chaperone Present] : A chaperone was present in the examining room during all aspects of the physical examination [[id number]] : A chaperone was present during the pelvic exam. [FreeTextEntry2] : tal [Appropriately responsive] : appropriately responsive [Alert] : alert [No Acute Distress] : no acute distress [No Lymphadenopathy] : no lymphadenopathy [Regular Rate Rhythm] : regular rate rhythm [No Murmurs] : no murmurs [Clear to Auscultation B/L] : clear to auscultation bilaterally [Soft] : soft [Non-tender] : non-tender [Non-distended] : non-distended [No HSM] : No HSM [No Lesions] : no lesions [No Mass] : no mass [Oriented x3] : oriented x3 [Examination Of The Breasts] : a normal appearance [No Masses] : no breast masses were palpable [Labia Majora] : normal [Labia Minora] : normal [No Bleeding] : There was no active vaginal bleeding [Normal] : normal [Uterine Adnexae] : normal [Declined] : Patient declined rectal exam

## 2024-07-24 NOTE — DISCUSSION/SUMMARY
[FreeTextEntry1] : 29-year-old -0-0-1 last menstrual cycle was 2024 presents with positive pregnancy test.  Bedside sonogram failed to show intrauterine gestational sac perhaps too early.  Pap GC chlamydia sent.  Zofran prescribed for nausea.  Patient to return in 2 weeks for follow-up.

## 2024-07-24 NOTE — HISTORY OF PRESENT ILLNESS
[Intravaginal Ring] : uses the intravaginal ring [Y] : Positive pregnancy history [Menarche Age: ____] : age at menarche was [unfilled] [FreeTextEntry1] : 29-year-old -0-0-1 last menstrual cycle was 2024 stop using NuvaRing and presents with positive pregnancy test.  She denies vaginal discharge or vaginal bleeding or pelvic pain.  She complains of nausea but no vomiting.  Zofran to be prescribed for nausea.  She is counseled on what to eat and how to eat. [PGxTotal] : 1 [[facility]] : 1 [PGHxPremature] : 0 [PGHxAbortions] : 0 [[facility] Centeriving] : 1 [PGHxABInduced] : 0 [PGHxABSpont] : 0 [PGHxEctopic] : 0 [PGHxMultBirths] : 0

## 2024-07-25 LAB
C TRACH RRNA SPEC QL NAA+PROBE: NOT DETECTED
HCG SERPL QL: POSITIVE
N GONORRHOEA RRNA SPEC QL NAA+PROBE: NOT DETECTED
PAPP-A SERPL-ACNC: 1564 MIU/ML
SOURCE TP AMPLIFICATION: NORMAL

## 2024-07-26 LAB — HPV HIGH+LOW RISK DNA PNL CVX: NOT DETECTED

## 2024-07-29 LAB — CYTOLOGY CVX/VAG DOC THIN PREP: NORMAL

## 2024-08-07 ENCOUNTER — APPOINTMENT (OUTPATIENT)
Dept: OBGYN | Facility: CLINIC | Age: 29
End: 2024-08-07

## 2024-08-07 PROBLEM — N91.1 SECONDARY AMENORRHEA: Status: RESOLVED | Noted: 2024-07-24 | Resolved: 2024-08-07

## 2024-08-07 PROBLEM — R11.0 NAUSEA: Status: ACTIVE | Noted: 2024-08-07

## 2024-08-07 PROBLEM — N91.1 SECONDARY AMENORRHEA: Status: ACTIVE | Noted: 2024-08-07

## 2024-08-07 PROCEDURE — 99459 PELVIC EXAMINATION: CPT

## 2024-08-07 PROCEDURE — 81025 URINE PREGNANCY TEST: CPT

## 2024-08-07 PROCEDURE — 99214 OFFICE O/P EST MOD 30 MIN: CPT

## 2024-08-07 NOTE — HISTORY OF PRESENT ILLNESS
[FreeTextEntry1] : 29-year-old -0-0-1 last menstrual cycle was 2024 presents with positive pregnancy test and bedside sonogram shows intrauterine gestation with fetal heart.  Patient continues to complain of nausea but no vomiting no bleeding or discharge.  Zofran prescribed.

## 2024-08-07 NOTE — DISCUSSION/SUMMARY
[FreeTextEntry1] : 29-year-old -0-0-1 last menstrual cycle was 2024 presents with positive pregnancy test.  Bedside sonogram shows intrauterine gestation with fetal heart.  Patient complains of nausea no vomiting or bleeding.  Zofran prescribed. OB sonogram ordered for dating. Return to the office in 2 weeks for follow-up. Discussions had for prenatal care with diet and exercise.

## 2024-08-07 NOTE — PHYSICAL EXAM
[Chaperone Present] : A chaperone was present in the examining room during all aspects of the physical examination [[id number]] : A chaperone was present during the pelvic exam. [FreeTextEntry2] : tal [Labia Majora] : normal [Labia Minora] : normal [Normal] : normal [Uterine Adnexae] : normal

## 2024-08-13 ENCOUNTER — ASOB RESULT (OUTPATIENT)
Age: 29
End: 2024-08-13

## 2024-08-13 ENCOUNTER — NON-APPOINTMENT (OUTPATIENT)
Age: 29
End: 2024-08-13

## 2024-08-13 ENCOUNTER — APPOINTMENT (OUTPATIENT)
Dept: ANTEPARTUM | Facility: CLINIC | Age: 29
End: 2024-08-13
Payer: COMMERCIAL

## 2024-08-13 PROCEDURE — 76817 TRANSVAGINAL US OBSTETRIC: CPT

## 2024-08-13 PROCEDURE — 76801 OB US < 14 WKS SINGLE FETUS: CPT

## 2024-08-14 NOTE — ED ADULT TRIAGE NOTE - BSA (M2)
on the discharge service for the patient. I have reviewed and made amendments to the documentation where necessary. 2.5

## 2024-08-21 DIAGNOSIS — Z34.91 ENCOUNTER FOR SUPERVISION OF NORMAL PREGNANCY, UNSPECIFIED, FIRST TRIMESTER: ICD-10-CM

## 2024-09-02 ENCOUNTER — NON-APPOINTMENT (OUTPATIENT)
Age: 29
End: 2024-09-02

## 2024-09-03 ENCOUNTER — NON-APPOINTMENT (OUTPATIENT)
Age: 29
End: 2024-09-03

## 2024-09-03 ENCOUNTER — APPOINTMENT (OUTPATIENT)
Dept: OBGYN | Facility: CLINIC | Age: 29
End: 2024-09-03
Payer: COMMERCIAL

## 2024-09-03 VITALS
BODY MASS INDEX: 35.79 KG/M2 | WEIGHT: 202 LBS | SYSTOLIC BLOOD PRESSURE: 100 MMHG | DIASTOLIC BLOOD PRESSURE: 66 MMHG | HEIGHT: 63 IN

## 2024-09-03 DIAGNOSIS — Z3A.10 10 WEEKS GESTATION OF PREGNANCY: ICD-10-CM

## 2024-09-03 LAB
APPEARANCE: CLEAR
BILIRUBIN URINE: NEGATIVE
BLOOD URINE: NEGATIVE
COLOR: YELLOW
GLUCOSE QUALITATIVE U: NEGATIVE
KETONES URINE: NEGATIVE
LEUKOCYTE ESTERASE URINE: NEGATIVE
NITRITE URINE: NEGATIVE
PH URINE: 7
PROTEIN URINE: NEGATIVE
SPECIFIC GRAVITY URINE: 1.02
UROBILINOGEN URINE: 0.2 (ref 0.2–?)

## 2024-09-03 PROCEDURE — 0500F INITIAL PRENATAL CARE VISIT: CPT

## 2024-09-09 ENCOUNTER — NON-APPOINTMENT (OUTPATIENT)
Age: 29
End: 2024-09-09

## 2024-09-18 ENCOUNTER — NON-APPOINTMENT (OUTPATIENT)
Age: 29
End: 2024-09-18

## 2024-09-19 ENCOUNTER — NON-APPOINTMENT (OUTPATIENT)
Age: 29
End: 2024-09-19

## 2024-09-19 ENCOUNTER — APPOINTMENT (OUTPATIENT)
Dept: OBGYN | Facility: CLINIC | Age: 29
End: 2024-09-19
Payer: COMMERCIAL

## 2024-09-19 VITALS
DIASTOLIC BLOOD PRESSURE: 70 MMHG | HEIGHT: 63 IN | BODY MASS INDEX: 35.08 KG/M2 | SYSTOLIC BLOOD PRESSURE: 120 MMHG | WEIGHT: 198 LBS

## 2024-09-19 DIAGNOSIS — Z3A.12 12 WEEKS GESTATION OF PREGNANCY: ICD-10-CM

## 2024-09-19 PROCEDURE — 0502F SUBSEQUENT PRENATAL CARE: CPT

## 2024-09-20 LAB
APPEARANCE: CLEAR
BILIRUBIN URINE: NEGATIVE
BLOOD URINE: ABNORMAL
COLOR: YELLOW
GLUCOSE QUALITATIVE U: NEGATIVE
KETONES URINE: NEGATIVE
LEUKOCYTE ESTERASE URINE: ABNORMAL
NITRITE URINE: NEGATIVE
PH URINE: 6.5
PROTEIN URINE: NEGATIVE
SPECIFIC GRAVITY URINE: 1.01
UROBILINOGEN URINE: 0.2 (ref 0.2–?)

## 2024-09-24 ENCOUNTER — APPOINTMENT (OUTPATIENT)
Dept: ANTEPARTUM | Facility: CLINIC | Age: 29
End: 2024-09-24
Payer: COMMERCIAL

## 2024-09-24 ENCOUNTER — ASOB RESULT (OUTPATIENT)
Age: 29
End: 2024-09-24

## 2024-09-24 PROCEDURE — 76813 OB US NUCHAL MEAS 1 GEST: CPT

## 2024-10-07 DIAGNOSIS — Z34.91 ENCOUNTER FOR SUPERVISION OF NORMAL PREGNANCY, UNSPECIFIED, FIRST TRIMESTER: ICD-10-CM

## 2024-10-07 DIAGNOSIS — Z3A.12 12 WEEKS GESTATION OF PREGNANCY: ICD-10-CM

## 2024-10-07 DIAGNOSIS — Z3A.10 10 WEEKS GESTATION OF PREGNANCY: ICD-10-CM

## 2024-10-14 ENCOUNTER — NON-APPOINTMENT (OUTPATIENT)
Age: 29
End: 2024-10-14

## 2024-10-15 ENCOUNTER — APPOINTMENT (OUTPATIENT)
Dept: OBGYN | Facility: CLINIC | Age: 29
End: 2024-10-15
Payer: COMMERCIAL

## 2024-10-15 VITALS
DIASTOLIC BLOOD PRESSURE: 62 MMHG | HEIGHT: 63 IN | SYSTOLIC BLOOD PRESSURE: 114 MMHG | BODY MASS INDEX: 35.61 KG/M2 | WEIGHT: 201 LBS

## 2024-10-15 DIAGNOSIS — Z3A.15 15 WEEKS GESTATION OF PREGNANCY: ICD-10-CM

## 2024-10-15 LAB
APPEARANCE: CLEAR
BILIRUBIN URINE: NEGATIVE
BLOOD URINE: NEGATIVE
COLOR: YELLOW
GLUCOSE QUALITATIVE U: NEGATIVE
KETONES URINE: NEGATIVE
LEUKOCYTE ESTERASE URINE: NEGATIVE
NITRITE URINE: NEGATIVE
PH URINE: 6.5
PROTEIN URINE: NEGATIVE
SPECIFIC GRAVITY URINE: >=1.03
UROBILINOGEN URINE: 1 (ref 0.2–?)

## 2024-10-15 PROCEDURE — 0502F SUBSEQUENT PRENATAL CARE: CPT

## 2024-10-29 ENCOUNTER — NON-APPOINTMENT (OUTPATIENT)
Age: 29
End: 2024-10-29

## 2024-10-29 DIAGNOSIS — Z34.92 ENCOUNTER FOR SUPERVISION OF NORMAL PREGNANCY, UNSPECIFIED, SECOND TRIMESTER: ICD-10-CM

## 2024-10-29 DIAGNOSIS — Z3A.15 15 WEEKS GESTATION OF PREGNANCY: ICD-10-CM

## 2024-11-05 ENCOUNTER — APPOINTMENT (OUTPATIENT)
Dept: OBGYN | Facility: CLINIC | Age: 29
End: 2024-11-05
Payer: COMMERCIAL

## 2024-11-05 VITALS
HEIGHT: 60 IN | SYSTOLIC BLOOD PRESSURE: 110 MMHG | DIASTOLIC BLOOD PRESSURE: 76 MMHG | BODY MASS INDEX: 39.27 KG/M2 | WEIGHT: 200 LBS

## 2024-11-05 PROBLEM — Z3A.20 20 WEEKS GESTATION OF PREGNANCY: Status: ACTIVE | Noted: 2024-11-05

## 2024-11-05 LAB
APPEARANCE: CLEAR
BILIRUBIN URINE: NEGATIVE
BLOOD URINE: NEGATIVE
COLOR: YELLOW
GLUCOSE QUALITATIVE U: NEGATIVE
KETONES URINE: NEGATIVE
LEUKOCYTE ESTERASE URINE: NEGATIVE
NITRITE URINE: NEGATIVE
PH URINE: 6.5
PROTEIN URINE: NEGATIVE
SPECIFIC GRAVITY URINE: >=1.03
UROBILINOGEN URINE: 0.2 (ref 0.2–?)

## 2024-11-05 PROCEDURE — 0502F SUBSEQUENT PRENATAL CARE: CPT

## 2024-11-06 ENCOUNTER — APPOINTMENT (OUTPATIENT)
Dept: ANTEPARTUM | Facility: CLINIC | Age: 29
End: 2024-11-06

## 2024-11-06 ENCOUNTER — ASOB RESULT (OUTPATIENT)
Age: 29
End: 2024-11-06

## 2024-11-06 LAB
AFP INTERP SERPL-IMP: NORMAL
AFP INTERP SERPL-IMP: NORMAL
AFP MOM CUT-OFF: 2.5
AFP MOM SERPL: 0.69
AFP PERCENTILE: 12.7
AFP SERPL-ACNC: 31.04 NG/ML
CARBAMAZEPINE?: NO
CURRENT SMOKER: NO
DIABETES STATUS PATIENT: NO
GA: NORMAL
GESTATIONAL AGE METHOD: NORMAL
HX OF NTD NARR: NO
MULTIPLE PREGNANCY: NORMAL
NEURAL TUBE DEFECT RISK FETUS: NORMAL
NEURAL TUBE DEFECT RISK POP: NORMAL
RECOM F/U: NO
TEST PERFORMANCE INFO SPEC: NORMAL
VALPROIC ACID?: NO

## 2024-11-06 PROCEDURE — 76811 OB US DETAILED SNGL FETUS: CPT

## 2024-11-06 PROCEDURE — 76817 TRANSVAGINAL US OBSTETRIC: CPT

## 2024-11-15 DIAGNOSIS — Z3A.20 20 WEEKS GESTATION OF PREGNANCY: ICD-10-CM

## 2024-11-26 ENCOUNTER — APPOINTMENT (OUTPATIENT)
Dept: OBGYN | Facility: CLINIC | Age: 29
End: 2024-11-26
Payer: COMMERCIAL

## 2024-11-26 VITALS
HEIGHT: 63 IN | BODY MASS INDEX: 35.97 KG/M2 | SYSTOLIC BLOOD PRESSURE: 100 MMHG | DIASTOLIC BLOOD PRESSURE: 62 MMHG | WEIGHT: 203 LBS

## 2024-11-26 LAB
APPEARANCE: CLEAR
BILIRUBIN URINE: NEGATIVE
BLOOD URINE: NEGATIVE
COLOR: YELLOW
GLUCOSE QUALITATIVE U: NEGATIVE
KETONES URINE: NEGATIVE
LEUKOCYTE ESTERASE URINE: NEGATIVE
NITRITE URINE: NEGATIVE
PH URINE: 7
PROTEIN URINE: NEGATIVE
SPECIFIC GRAVITY URINE: 1.02
UROBILINOGEN URINE: 2 (ref 0.2–?)

## 2024-11-26 PROCEDURE — 0502F SUBSEQUENT PRENATAL CARE: CPT

## 2024-11-27 DIAGNOSIS — Z3A.22 22 WEEKS GESTATION OF PREGNANCY: ICD-10-CM

## 2024-12-10 ENCOUNTER — APPOINTMENT (OUTPATIENT)
Dept: OBGYN | Facility: CLINIC | Age: 29
End: 2024-12-10
Payer: COMMERCIAL

## 2024-12-10 VITALS
DIASTOLIC BLOOD PRESSURE: 82 MMHG | SYSTOLIC BLOOD PRESSURE: 110 MMHG | HEIGHT: 63 IN | BODY MASS INDEX: 36.14 KG/M2 | WEIGHT: 204 LBS

## 2024-12-10 DIAGNOSIS — Z3A.24 24 WEEKS GESTATION OF PREGNANCY: ICD-10-CM

## 2024-12-10 LAB
APPEARANCE: CLEAR
BILIRUBIN URINE: NEGATIVE
BLOOD URINE: NEGATIVE
COLOR: YELLOW
GLUCOSE QUALITATIVE U: NEGATIVE
KETONES URINE: NEGATIVE
LEUKOCYTE ESTERASE URINE: ABNORMAL
NITRITE URINE: NEGATIVE
PH URINE: 7.5
PROTEIN URINE: NEGATIVE
SPECIFIC GRAVITY URINE: 1.01
UROBILINOGEN URINE: 0.2 (ref 0.2–?)

## 2024-12-10 PROCEDURE — 0502F SUBSEQUENT PRENATAL CARE: CPT

## 2024-12-11 LAB
BASOPHILS # BLD AUTO: 0.03 K/UL
BASOPHILS NFR BLD AUTO: 0.3 %
EOSINOPHIL # BLD AUTO: 0.04 K/UL
EOSINOPHIL NFR BLD AUTO: 0.4 %
ESTIMATED AVERAGE GLUCOSE: 91 MG/DL
GLUCOSE BS SERPL-MCNC: 78 MG/DL
HBA1C MFR BLD HPLC: 4.8 %
HCT VFR BLD CALC: 33.9 %
HGB BLD-MCNC: 11.4 G/DL
IMM GRANULOCYTES NFR BLD AUTO: 0.2 %
LYMPHOCYTES # BLD AUTO: 1.79 K/UL
LYMPHOCYTES NFR BLD AUTO: 19.3 %
MAN DIFF?: NORMAL
MCHC RBC-ENTMCNC: 32.2 PG
MCHC RBC-ENTMCNC: 33.6 G/DL
MCV RBC AUTO: 95.8 FL
MONOCYTES # BLD AUTO: 0.78 K/UL
MONOCYTES NFR BLD AUTO: 8.4 %
NEUTROPHILS # BLD AUTO: 6.6 K/UL
NEUTROPHILS NFR BLD AUTO: 71.4 %
PLATELET # BLD AUTO: 180 K/UL
RBC # BLD: 3.54 M/UL
RBC # FLD: 13.3 %
WBC # FLD AUTO: 9.26 K/UL

## 2024-12-26 ENCOUNTER — NON-APPOINTMENT (OUTPATIENT)
Age: 29
End: 2024-12-26

## 2025-01-02 ENCOUNTER — NON-APPOINTMENT (OUTPATIENT)
Age: 30
End: 2025-01-02

## 2025-01-03 ENCOUNTER — APPOINTMENT (OUTPATIENT)
Dept: OBGYN | Facility: CLINIC | Age: 30
End: 2025-01-03
Payer: COMMERCIAL

## 2025-01-03 VITALS
BODY MASS INDEX: 36.6 KG/M2 | DIASTOLIC BLOOD PRESSURE: 62 MMHG | HEIGHT: 63 IN | SYSTOLIC BLOOD PRESSURE: 100 MMHG | WEIGHT: 206.56 LBS

## 2025-01-03 DIAGNOSIS — Z3A.28 28 WEEKS GESTATION OF PREGNANCY: ICD-10-CM

## 2025-01-03 LAB
APPEARANCE: CLEAR
BILIRUBIN URINE: NEGATIVE
BLOOD URINE: NEGATIVE
COLOR: YELLOW
GLUCOSE QUALITATIVE U: NEGATIVE
KETONES URINE: ABNORMAL
LEUKOCYTE ESTERASE URINE: NEGATIVE
NITRITE URINE: NEGATIVE
PH URINE: 6.5
PROTEIN URINE: ABNORMAL
SPECIFIC GRAVITY URINE: >=1.03
UROBILINOGEN URINE: 1 (ref 0.2–?)

## 2025-01-03 PROCEDURE — 0502F SUBSEQUENT PRENATAL CARE: CPT

## 2025-01-05 LAB
HCT VFR BLD CALC: 32.2 %
HGB BLD-MCNC: 10.7 G/DL
MCHC RBC-ENTMCNC: 31.3 PG
MCHC RBC-ENTMCNC: 33.2 G/DL
MCV RBC AUTO: 94.2 FL
PLATELET # BLD AUTO: 201 K/UL
RBC # BLD: 3.42 M/UL
RBC # FLD: 13.3 %
T PALLIDUM AB SER QL IA: NEGATIVE
WBC # FLD AUTO: 9.02 K/UL

## 2025-01-08 ENCOUNTER — ASOB RESULT (OUTPATIENT)
Age: 30
End: 2025-01-08

## 2025-01-08 ENCOUNTER — APPOINTMENT (OUTPATIENT)
Dept: ANTEPARTUM | Facility: CLINIC | Age: 30
End: 2025-01-08
Payer: MEDICAID

## 2025-01-08 PROCEDURE — 76816 OB US FOLLOW-UP PER FETUS: CPT

## 2025-01-16 ENCOUNTER — NON-APPOINTMENT (OUTPATIENT)
Age: 30
End: 2025-01-16

## 2025-01-16 DIAGNOSIS — Z3A.28 28 WEEKS GESTATION OF PREGNANCY: ICD-10-CM

## 2025-01-16 DIAGNOSIS — Z3A.24 24 WEEKS GESTATION OF PREGNANCY: ICD-10-CM

## 2025-01-24 ENCOUNTER — APPOINTMENT (OUTPATIENT)
Dept: OBGYN | Facility: CLINIC | Age: 30
End: 2025-01-24
Payer: MEDICAID

## 2025-01-24 VITALS
BODY MASS INDEX: 36.68 KG/M2 | HEIGHT: 63 IN | SYSTOLIC BLOOD PRESSURE: 100 MMHG | WEIGHT: 207 LBS | DIASTOLIC BLOOD PRESSURE: 70 MMHG

## 2025-01-24 PROBLEM — Z3A.31 31 WEEKS GESTATION OF PREGNANCY: Status: ACTIVE | Noted: 2025-01-24

## 2025-01-24 LAB
APPEARANCE: CLEAR
BILIRUBIN URINE: NEGATIVE
BLOOD URINE: NEGATIVE
COLOR: YELLOW
GLUCOSE QUALITATIVE U: NEGATIVE
KETONES URINE: NEGATIVE
LEUKOCYTE ESTERASE URINE: NEGATIVE
NITRITE URINE: NEGATIVE
PH URINE: 7
PROTEIN URINE: NEGATIVE
SPECIFIC GRAVITY URINE: 1.01
UROBILINOGEN URINE: 1 (ref 0.2–?)

## 2025-01-24 PROCEDURE — 0502F SUBSEQUENT PRENATAL CARE: CPT

## 2025-01-27 DIAGNOSIS — Z3A.31 31 WEEKS GESTATION OF PREGNANCY: ICD-10-CM

## 2025-02-04 ENCOUNTER — NON-APPOINTMENT (OUTPATIENT)
Age: 30
End: 2025-02-04

## 2025-02-05 ENCOUNTER — APPOINTMENT (OUTPATIENT)
Dept: OBGYN | Facility: CLINIC | Age: 30
End: 2025-02-05
Payer: MEDICAID

## 2025-02-05 ENCOUNTER — ASOB RESULT (OUTPATIENT)
Age: 30
End: 2025-02-05

## 2025-02-05 ENCOUNTER — APPOINTMENT (OUTPATIENT)
Dept: ANTEPARTUM | Facility: CLINIC | Age: 30
End: 2025-02-05
Payer: MEDICAID

## 2025-02-05 VITALS
DIASTOLIC BLOOD PRESSURE: 84 MMHG | WEIGHT: 211.7 LBS | HEIGHT: 63 IN | SYSTOLIC BLOOD PRESSURE: 120 MMHG | BODY MASS INDEX: 37.51 KG/M2

## 2025-02-05 PROBLEM — Z3A.32 32 WEEKS GESTATION OF PREGNANCY: Status: ACTIVE | Noted: 2025-02-05

## 2025-02-05 LAB
APPEARANCE: CLEAR
BILIRUBIN URINE: NEGATIVE
BLOOD URINE: NEGATIVE
COLOR: YELLOW
GLUCOSE QUALITATIVE U: NEGATIVE
KETONES URINE: NEGATIVE
LEUKOCYTE ESTERASE URINE: ABNORMAL
NITRITE URINE: NEGATIVE
PH URINE: 7
PROTEIN URINE: NEGATIVE
SPECIFIC GRAVITY URINE: 1.01
UROBILINOGEN URINE: 0.2 (ref 0.2–?)

## 2025-02-05 PROCEDURE — 76819 FETAL BIOPHYS PROFIL W/O NST: CPT | Mod: 59

## 2025-02-05 PROCEDURE — 76816 OB US FOLLOW-UP PER FETUS: CPT

## 2025-02-05 PROCEDURE — 0502F SUBSEQUENT PRENATAL CARE: CPT

## 2025-02-12 DIAGNOSIS — Z34.92 ENCOUNTER FOR SUPERVISION OF NORMAL PREGNANCY, UNSPECIFIED, SECOND TRIMESTER: ICD-10-CM

## 2025-02-12 DIAGNOSIS — Z3A.32 32 WEEKS GESTATION OF PREGNANCY: ICD-10-CM

## 2025-02-21 ENCOUNTER — NON-APPOINTMENT (OUTPATIENT)
Age: 30
End: 2025-02-21

## 2025-02-21 ENCOUNTER — APPOINTMENT (OUTPATIENT)
Dept: OBGYN | Facility: CLINIC | Age: 30
End: 2025-02-21
Payer: MEDICAID

## 2025-02-21 PROBLEM — Z3A.35 35 WEEKS GESTATION OF PREGNANCY: Status: ACTIVE | Noted: 2025-02-21

## 2025-02-21 LAB
APPEARANCE: CLEAR
BILIRUBIN URINE: NEGATIVE
BLOOD URINE: NEGATIVE
COLOR: YELLOW
GLUCOSE QUALITATIVE U: NEGATIVE
KETONES URINE: NEGATIVE
LEUKOCYTE ESTERASE URINE: NEGATIVE
NITRITE URINE: NEGATIVE
PH URINE: 5.5
PROTEIN URINE: 30
SPECIFIC GRAVITY URINE: >=1.03
UROBILINOGEN URINE: 0.2 (ref 0.2–?)

## 2025-02-21 PROCEDURE — 0502F SUBSEQUENT PRENATAL CARE: CPT

## 2025-02-24 LAB
GP B STREP DNA SPEC QL NAA+PROBE: NOT DETECTED
SOURCE GBS: NORMAL

## 2025-02-25 ENCOUNTER — APPOINTMENT (OUTPATIENT)
Dept: OBGYN | Facility: CLINIC | Age: 30
End: 2025-02-25
Payer: MEDICAID

## 2025-02-25 VITALS
SYSTOLIC BLOOD PRESSURE: 100 MMHG | DIASTOLIC BLOOD PRESSURE: 70 MMHG | WEIGHT: 222.25 LBS | BODY MASS INDEX: 39.38 KG/M2 | HEIGHT: 63 IN

## 2025-02-25 DIAGNOSIS — Z3A.35 35 WEEKS GESTATION OF PREGNANCY: ICD-10-CM

## 2025-02-25 LAB
APPEARANCE: CLEAR
BILIRUBIN URINE: NEGATIVE
BLOOD URINE: ABNORMAL
COLOR: YELLOW
GLUCOSE QUALITATIVE U: NEGATIVE
KETONES URINE: ABNORMAL
LEUKOCYTE ESTERASE URINE: ABNORMAL
NITRITE URINE: NEGATIVE
PH URINE: 6.5
PROTEIN URINE: ABNORMAL
SPECIFIC GRAVITY URINE: 1.02
UROBILINOGEN URINE: 1 (ref 0.2–?)

## 2025-02-25 PROCEDURE — 0502F SUBSEQUENT PRENATAL CARE: CPT

## 2025-02-27 ENCOUNTER — NON-APPOINTMENT (OUTPATIENT)
Age: 30
End: 2025-02-27

## 2025-03-05 ENCOUNTER — APPOINTMENT (OUTPATIENT)
Dept: OBGYN | Facility: CLINIC | Age: 30
End: 2025-03-05
Payer: MEDICAID

## 2025-03-05 ENCOUNTER — APPOINTMENT (OUTPATIENT)
Dept: ANTEPARTUM | Facility: CLINIC | Age: 30
End: 2025-03-05
Payer: MEDICAID

## 2025-03-05 ENCOUNTER — ASOB RESULT (OUTPATIENT)
Age: 30
End: 2025-03-05

## 2025-03-05 VITALS
SYSTOLIC BLOOD PRESSURE: 130 MMHG | WEIGHT: 225.7 LBS | HEIGHT: 63 IN | DIASTOLIC BLOOD PRESSURE: 84 MMHG | BODY MASS INDEX: 39.99 KG/M2

## 2025-03-05 DIAGNOSIS — Z3A.36 36 WEEKS GESTATION OF PREGNANCY: ICD-10-CM

## 2025-03-05 DIAGNOSIS — Z34.93 ENCOUNTER FOR SUPERVISION OF NORMAL PREGNANCY, UNSPECIFIED, THIRD TRIMESTER: ICD-10-CM

## 2025-03-05 DIAGNOSIS — Z3A.35 35 WEEKS GESTATION OF PREGNANCY: ICD-10-CM

## 2025-03-05 LAB
APPEARANCE: CLEAR
BILIRUBIN URINE: NEGATIVE
BLOOD URINE: ABNORMAL
COLOR: YELLOW
GLUCOSE QUALITATIVE U: NEGATIVE
HCT VFR BLD CALC: 29.3 %
HGB BLD-MCNC: 9.6 G/DL
KETONES URINE: NEGATIVE
LEUKOCYTE ESTERASE URINE: ABNORMAL
MCHC RBC-ENTMCNC: 29.6 PG
MCHC RBC-ENTMCNC: 32.8 G/DL
MCV RBC AUTO: 90.4 FL
NITRITE URINE: NEGATIVE
PH URINE: 6.5
PLATELET # BLD AUTO: 149 K/UL
PROTEIN URINE: NEGATIVE
RBC # BLD: 3.24 M/UL
RBC # FLD: 13.3 %
SPECIFIC GRAVITY URINE: 1.02
UROBILINOGEN URINE: 0.2 (ref 0.2–?)
WBC # FLD AUTO: 7.15 K/UL

## 2025-03-05 PROCEDURE — ZZZZZ: CPT

## 2025-03-05 PROCEDURE — 76818 FETAL BIOPHYS PROFILE W/NST: CPT | Mod: 59

## 2025-03-05 PROCEDURE — 76816 OB US FOLLOW-UP PER FETUS: CPT

## 2025-03-05 PROCEDURE — 0502F SUBSEQUENT PRENATAL CARE: CPT

## 2025-03-06 ENCOUNTER — NON-APPOINTMENT (OUTPATIENT)
Age: 30
End: 2025-03-06

## 2025-03-06 LAB
HCV AB SER QL: NONREACTIVE
HCV S/CO RATIO: 0.08 S/CO
HIV1+2 AB SPEC QL IA.RAPID: NONREACTIVE
T PALLIDUM AB SER QL IA: NEGATIVE

## 2025-03-12 ENCOUNTER — ASOB RESULT (OUTPATIENT)
Age: 30
End: 2025-03-12

## 2025-03-12 ENCOUNTER — APPOINTMENT (OUTPATIENT)
Dept: ANTEPARTUM | Facility: CLINIC | Age: 30
End: 2025-03-12
Payer: MEDICAID

## 2025-03-12 ENCOUNTER — APPOINTMENT (OUTPATIENT)
Dept: OBGYN | Facility: CLINIC | Age: 30
End: 2025-03-12
Payer: MEDICAID

## 2025-03-12 VITALS
SYSTOLIC BLOOD PRESSURE: 120 MMHG | DIASTOLIC BLOOD PRESSURE: 82 MMHG | WEIGHT: 229.8 LBS | HEIGHT: 63 IN | BODY MASS INDEX: 40.72 KG/M2

## 2025-03-12 DIAGNOSIS — Z3A.37 37 WEEKS GESTATION OF PREGNANCY: ICD-10-CM

## 2025-03-12 LAB
APPEARANCE: ABNORMAL
BILIRUBIN URINE: NEGATIVE
BLOOD URINE: NEGATIVE
COLOR: ABNORMAL
GLUCOSE QUALITATIVE U: NEGATIVE
KETONES URINE: ABNORMAL
LEUKOCYTE ESTERASE URINE: ABNORMAL
NITRITE URINE: NEGATIVE
PH URINE: 7
PROTEIN URINE: NEGATIVE
SPECIFIC GRAVITY URINE: 1.02
UROBILINOGEN URINE: 1 (ref 0.2–?)

## 2025-03-12 PROCEDURE — 0502F SUBSEQUENT PRENATAL CARE: CPT

## 2025-03-12 PROCEDURE — 76818 FETAL BIOPHYS PROFILE W/NST: CPT

## 2025-03-16 RX ORDER — CITRIC ACID/SODIUM CITRATE 300-500 MG
30 SOLUTION, ORAL ORAL ONCE
Refills: 0 | Status: DISCONTINUED | OUTPATIENT
Start: 2025-03-21 | End: 2025-03-23

## 2025-03-16 RX ORDER — SCOPOLAMINE 1 MG/3D
1 PATCH, EXTENDED RELEASE TRANSDERMAL
Refills: 0 | Status: DISCONTINUED | OUTPATIENT
Start: 2025-03-21 | End: 2025-03-23

## 2025-03-16 RX ORDER — SODIUM CHLORIDE 9 G/1000ML
1000 INJECTION, SOLUTION INTRAVENOUS
Refills: 0 | Status: DISCONTINUED | OUTPATIENT
Start: 2025-03-21 | End: 2025-03-23

## 2025-03-16 RX ORDER — SODIUM CHLORIDE 9 G/1000ML
1000 INJECTION, SOLUTION INTRAVENOUS
Refills: 0 | Status: DISCONTINUED | OUTPATIENT
Start: 2025-03-21 | End: 2025-03-21

## 2025-03-16 RX ORDER — LEVOTHYROXINE SODIUM 300 MCG
75 TABLET ORAL DAILY
Refills: 0 | Status: DISCONTINUED | OUTPATIENT
Start: 2025-03-21 | End: 2025-03-23

## 2025-03-18 ENCOUNTER — APPOINTMENT (OUTPATIENT)
Dept: OBGYN | Facility: CLINIC | Age: 30
End: 2025-03-18
Payer: MEDICAID

## 2025-03-18 VITALS
SYSTOLIC BLOOD PRESSURE: 120 MMHG | BODY MASS INDEX: 40.93 KG/M2 | WEIGHT: 231 LBS | DIASTOLIC BLOOD PRESSURE: 70 MMHG | HEIGHT: 63 IN

## 2025-03-18 PROBLEM — Z3A.38 38 WEEKS GESTATION OF PREGNANCY: Status: ACTIVE | Noted: 2025-03-18

## 2025-03-18 PROCEDURE — 0502F SUBSEQUENT PRENATAL CARE: CPT

## 2025-03-19 ENCOUNTER — ASOB RESULT (OUTPATIENT)
Age: 30
End: 2025-03-19

## 2025-03-19 ENCOUNTER — OUTPATIENT (OUTPATIENT)
Dept: OUTPATIENT SERVICES | Facility: HOSPITAL | Age: 30
LOS: 1 days | End: 2025-03-19
Payer: COMMERCIAL

## 2025-03-19 ENCOUNTER — APPOINTMENT (OUTPATIENT)
Dept: ANTEPARTUM | Facility: CLINIC | Age: 30
End: 2025-03-19
Payer: MEDICAID

## 2025-03-19 DIAGNOSIS — Z01.812 ENCOUNTER FOR PREPROCEDURAL LABORATORY EXAMINATION: ICD-10-CM

## 2025-03-19 LAB
APPEARANCE: CLEAR
BILIRUBIN URINE: NEGATIVE
BLD GP AB SCN SERPL QL: SIGNIFICANT CHANGE UP
BLOOD URINE: NEGATIVE
COLOR: YELLOW
COMMENT - BLOOD BANK: SIGNIFICANT CHANGE UP
GLUCOSE QUALITATIVE U: NEGATIVE
HCT VFR BLD CALC: 29.9 % — LOW (ref 34.5–45)
KETONES URINE: ABNORMAL
LEUKOCYTE ESTERASE URINE: NEGATIVE
MCHC RBC-ENTMCNC: 29.5 PG — SIGNIFICANT CHANGE UP (ref 27–34)
MCHC RBC-ENTMCNC: 33.4 G/DL — SIGNIFICANT CHANGE UP (ref 32–36)
MCV RBC AUTO: 88.2 FL — SIGNIFICANT CHANGE UP (ref 80–100)
NITRITE URINE: NEGATIVE
NRBC # BLD AUTO: 0 K/UL — SIGNIFICANT CHANGE UP (ref 0–0)
NRBC # FLD: 0 K/UL — SIGNIFICANT CHANGE UP (ref 0–0)
NRBC BLD AUTO-RTO: 0 /100 WBCS — SIGNIFICANT CHANGE UP (ref 0–0)
PH URINE: 7
PLATELET # BLD AUTO: 171 K/UL — SIGNIFICANT CHANGE UP (ref 150–400)
PMV BLD: 13.6 FL — HIGH (ref 7–13)
PROTEIN URINE: NEGATIVE
RBC # BLD: 3.39 M/UL — LOW (ref 3.8–5.2)
RBC # FLD: 13.3 % — SIGNIFICANT CHANGE UP (ref 10.3–14.5)
SPECIFIC GRAVITY URINE: 1.02
UROBILINOGEN URINE: 0.2 (ref 0.2–?)
WBC # BLD: 6.4 K/UL — SIGNIFICANT CHANGE UP (ref 3.8–10.5)
WBC # FLD AUTO: 6.4 K/UL — SIGNIFICANT CHANGE UP (ref 3.8–10.5)

## 2025-03-19 PROCEDURE — 36415 COLL VENOUS BLD VENIPUNCTURE: CPT

## 2025-03-19 PROCEDURE — 86901 BLOOD TYPING SEROLOGIC RH(D): CPT

## 2025-03-19 PROCEDURE — 86900 BLOOD TYPING SEROLOGIC ABO: CPT

## 2025-03-19 PROCEDURE — 76818 FETAL BIOPHYS PROFILE W/NST: CPT

## 2025-03-19 PROCEDURE — 86850 RBC ANTIBODY SCREEN: CPT

## 2025-03-19 PROCEDURE — 85027 COMPLETE CBC AUTOMATED: CPT

## 2025-03-21 ENCOUNTER — TRANSCRIPTION ENCOUNTER (OUTPATIENT)
Age: 30
End: 2025-03-21

## 2025-03-21 ENCOUNTER — APPOINTMENT (OUTPATIENT)
Dept: OBGYN | Facility: HOSPITAL | Age: 30
End: 2025-03-21

## 2025-03-21 ENCOUNTER — INPATIENT (INPATIENT)
Facility: HOSPITAL | Age: 30
LOS: 1 days | Discharge: HOME | End: 2025-03-23
Attending: OBSTETRICS & GYNECOLOGY | Admitting: OBSTETRICS & GYNECOLOGY
Payer: COMMERCIAL

## 2025-03-21 VITALS
DIASTOLIC BLOOD PRESSURE: 82 MMHG | SYSTOLIC BLOOD PRESSURE: 138 MMHG | HEART RATE: 82 BPM | TEMPERATURE: 98 F | RESPIRATION RATE: 18 BRPM | WEIGHT: 200.62 LBS | HEIGHT: 63 IN

## 2025-03-21 DIAGNOSIS — Z90.3 ACQUIRED ABSENCE OF STOMACH [PART OF]: Chronic | ICD-10-CM

## 2025-03-21 DIAGNOSIS — O99.841 BARIATRIC SURGERY STATUS COMPLICATING PREGNANCY, FIRST TRIMESTER: ICD-10-CM

## 2025-03-21 LAB
BASOPHILS # BLD AUTO: 0.02 K/UL — SIGNIFICANT CHANGE UP (ref 0–0.2)
BASOPHILS NFR BLD AUTO: 0.3 % — SIGNIFICANT CHANGE UP (ref 0–2)
BLD GP AB SCN SERPL QL: SIGNIFICANT CHANGE UP
EOSINOPHIL # BLD AUTO: 0 K/UL — SIGNIFICANT CHANGE UP (ref 0–0.5)
EOSINOPHIL NFR BLD AUTO: 0 % — SIGNIFICANT CHANGE UP (ref 0–6)
HCT VFR BLD CALC: 31.9 % — LOW (ref 34.5–45)
HGB BLD-MCNC: 10.7 G/DL — LOW (ref 11.5–15.5)
IMM GRANULOCYTES # BLD AUTO: 0.04 K/UL — SIGNIFICANT CHANGE UP (ref 0–0.07)
IMM GRANULOCYTES NFR BLD AUTO: 0.6 % — SIGNIFICANT CHANGE UP (ref 0–0.9)
LYMPHOCYTES # BLD AUTO: 1.56 K/UL — SIGNIFICANT CHANGE UP (ref 1–3.3)
LYMPHOCYTES NFR BLD AUTO: 22.8 % — SIGNIFICANT CHANGE UP (ref 13–44)
MCHC RBC-ENTMCNC: 29.6 PG — SIGNIFICANT CHANGE UP (ref 27–34)
MCHC RBC-ENTMCNC: 33.5 G/DL — SIGNIFICANT CHANGE UP (ref 32–36)
MCV RBC AUTO: 88.1 FL — SIGNIFICANT CHANGE UP (ref 80–100)
MONOCYTES # BLD AUTO: 0.76 K/UL — SIGNIFICANT CHANGE UP (ref 0–0.9)
MONOCYTES NFR BLD AUTO: 11.1 % — SIGNIFICANT CHANGE UP (ref 2–14)
NEUTROPHILS # BLD AUTO: 4.45 K/UL — SIGNIFICANT CHANGE UP (ref 1.8–7.4)
NEUTROPHILS NFR BLD AUTO: 65.2 % — SIGNIFICANT CHANGE UP (ref 43–77)
NRBC # BLD AUTO: 0 K/UL — SIGNIFICANT CHANGE UP (ref 0–0)
NRBC # FLD: 0 K/UL — SIGNIFICANT CHANGE UP (ref 0–0)
NRBC BLD AUTO-RTO: 0 /100 WBCS — SIGNIFICANT CHANGE UP (ref 0–0)
PLATELET # BLD AUTO: 187 K/UL — SIGNIFICANT CHANGE UP (ref 150–400)
PMV BLD: 13.2 FL — HIGH (ref 7–13)
RBC # BLD: 3.62 M/UL — LOW (ref 3.8–5.2)
RBC # FLD: 13.2 % — SIGNIFICANT CHANGE UP (ref 10.3–14.5)
T PALLIDUM AB TITR SER: NEGATIVE — SIGNIFICANT CHANGE UP
WBC # BLD: 6.83 K/UL — SIGNIFICANT CHANGE UP (ref 3.8–10.5)
WBC # FLD AUTO: 6.83 K/UL — SIGNIFICANT CHANGE UP (ref 3.8–10.5)

## 2025-03-21 PROCEDURE — 59514 CESAREAN DELIVERY ONLY: CPT | Mod: 80,U9

## 2025-03-21 PROCEDURE — 59510 CESAREAN DELIVERY: CPT | Mod: U9

## 2025-03-21 RX ORDER — MODIFIED LANOLIN 100 %
1 CREAM (GRAM) TOPICAL EVERY 6 HOURS
Refills: 0 | Status: DISCONTINUED | OUTPATIENT
Start: 2025-03-21 | End: 2025-03-21

## 2025-03-21 RX ORDER — DIPHENHYDRAMINE HCL 12.5MG/5ML
25 ELIXIR ORAL EVERY 6 HOURS
Refills: 0 | Status: DISCONTINUED | OUTPATIENT
Start: 2025-03-21 | End: 2025-03-23

## 2025-03-21 RX ORDER — OXYCODONE HYDROCHLORIDE 30 MG/1
5 TABLET ORAL
Refills: 0 | Status: DISCONTINUED | OUTPATIENT
Start: 2025-03-21 | End: 2025-03-23

## 2025-03-21 RX ORDER — DIPHENHYDRAMINE HCL 12.5MG/5ML
25 ELIXIR ORAL EVERY 6 HOURS
Refills: 0 | Status: DISCONTINUED | OUTPATIENT
Start: 2025-03-21 | End: 2025-03-21

## 2025-03-21 RX ORDER — ACETAMINOPHEN 500 MG/5ML
975 LIQUID (ML) ORAL ONCE
Refills: 0 | Status: COMPLETED | OUTPATIENT
Start: 2025-03-21 | End: 2025-03-21

## 2025-03-21 RX ORDER — SIMETHICONE 80 MG
80 TABLET,CHEWABLE ORAL EVERY 4 HOURS
Refills: 0 | Status: DISCONTINUED | OUTPATIENT
Start: 2025-03-21 | End: 2025-03-21

## 2025-03-21 RX ORDER — OXYTOCIN-SODIUM CHLORIDE 0.9% IV SOLN 30 UNIT/500ML 30-0.9/5 UT/ML-%
42 SOLUTION INTRAVENOUS
Qty: 30 | Refills: 0 | Status: DISCONTINUED | OUTPATIENT
Start: 2025-03-21 | End: 2025-03-23

## 2025-03-21 RX ORDER — OXYTOCIN-SODIUM CHLORIDE 0.9% IV SOLN 30 UNIT/500ML 30-0.9/5 UT/ML-%
42 SOLUTION INTRAVENOUS
Qty: 30 | Refills: 0 | Status: DISCONTINUED | OUTPATIENT
Start: 2025-03-21 | End: 2025-03-21

## 2025-03-21 RX ORDER — CEFAZOLIN SODIUM IN 0.9 % NACL 3 G/100 ML
2000 INTRAVENOUS SOLUTION, PIGGYBACK (ML) INTRAVENOUS ONCE
Refills: 0 | Status: DISCONTINUED | OUTPATIENT
Start: 2025-03-21 | End: 2025-03-21

## 2025-03-21 RX ORDER — AZITHROMYCIN 250 MG
500 CAPSULE ORAL ONCE
Refills: 0 | Status: COMPLETED | OUTPATIENT
Start: 2025-03-21 | End: 2025-03-21

## 2025-03-21 RX ORDER — METFORMIN HYDROCHLORIDE 850 MG/1
1000 TABLET ORAL AT BEDTIME
Refills: 0 | Status: DISCONTINUED | OUTPATIENT
Start: 2025-03-21 | End: 2025-03-23

## 2025-03-21 RX ORDER — CEFAZOLIN SODIUM IN 0.9 % NACL 3 G/100 ML
2000 INTRAVENOUS SOLUTION, PIGGYBACK (ML) INTRAVENOUS ONCE
Refills: 0 | Status: COMPLETED | OUTPATIENT
Start: 2025-03-21 | End: 2025-03-21

## 2025-03-21 RX ORDER — SODIUM CHLORIDE 9 G/1000ML
1000 INJECTION, SOLUTION INTRAVENOUS
Refills: 0 | Status: DISCONTINUED | OUTPATIENT
Start: 2025-03-21 | End: 2025-03-21

## 2025-03-21 RX ORDER — MODIFIED LANOLIN 100 %
1 CREAM (GRAM) TOPICAL EVERY 6 HOURS
Refills: 0 | Status: DISCONTINUED | OUTPATIENT
Start: 2025-03-21 | End: 2025-03-23

## 2025-03-21 RX ORDER — CITRIC ACID/SODIUM CITRATE 300-500 MG
30 SOLUTION, ORAL ORAL ONCE
Refills: 0 | Status: COMPLETED | OUTPATIENT
Start: 2025-03-21 | End: 2025-03-21

## 2025-03-21 RX ORDER — MAGNESIUM HYDROXIDE 400 MG/5ML
30 SUSPENSION ORAL
Refills: 0 | Status: DISCONTINUED | OUTPATIENT
Start: 2025-03-21 | End: 2025-03-21

## 2025-03-21 RX ORDER — OXYCODONE HYDROCHLORIDE 30 MG/1
5 TABLET ORAL ONCE
Refills: 0 | Status: DISCONTINUED | OUTPATIENT
Start: 2025-03-21 | End: 2025-03-23

## 2025-03-21 RX ORDER — IBUPROFEN 200 MG
600 TABLET ORAL EVERY 6 HOURS
Refills: 0 | Status: DISCONTINUED | OUTPATIENT
Start: 2025-03-21 | End: 2025-03-22

## 2025-03-21 RX ORDER — ACETAMINOPHEN 500 MG/5ML
975 LIQUID (ML) ORAL
Refills: 0 | Status: DISCONTINUED | OUTPATIENT
Start: 2025-03-21 | End: 2025-03-23

## 2025-03-21 RX ORDER — KETOROLAC TROMETHAMINE 30 MG/ML
30 INJECTION, SOLUTION INTRAMUSCULAR; INTRAVENOUS EVERY 6 HOURS
Refills: 0 | Status: DISCONTINUED | OUTPATIENT
Start: 2025-03-21 | End: 2025-03-22

## 2025-03-21 RX ORDER — SIMETHICONE 80 MG
80 TABLET,CHEWABLE ORAL EVERY 4 HOURS
Refills: 0 | Status: DISCONTINUED | OUTPATIENT
Start: 2025-03-21 | End: 2025-03-23

## 2025-03-21 RX ORDER — ACETAMINOPHEN 500 MG/5ML
975 LIQUID (ML) ORAL
Refills: 0 | Status: DISCONTINUED | OUTPATIENT
Start: 2025-03-21 | End: 2025-03-21

## 2025-03-21 RX ORDER — ENOXAPARIN SODIUM 100 MG/ML
60 INJECTION SUBCUTANEOUS EVERY 24 HOURS
Refills: 0 | Status: DISCONTINUED | OUTPATIENT
Start: 2025-03-21 | End: 2025-03-23

## 2025-03-21 RX ORDER — METFORMIN HYDROCHLORIDE 850 MG/1
750 TABLET ORAL
Refills: 0 | Status: DISCONTINUED | OUTPATIENT
Start: 2025-03-21 | End: 2025-03-21

## 2025-03-21 RX ORDER — MAGNESIUM HYDROXIDE 400 MG/5ML
30 SUSPENSION ORAL
Refills: 0 | Status: DISCONTINUED | OUTPATIENT
Start: 2025-03-21 | End: 2025-03-23

## 2025-03-21 RX ADMIN — KETOROLAC TROMETHAMINE 30 MILLIGRAM(S): 30 INJECTION, SOLUTION INTRAMUSCULAR; INTRAVENOUS at 15:01

## 2025-03-21 RX ADMIN — ENOXAPARIN SODIUM 60 MILLIGRAM(S): 100 INJECTION SUBCUTANEOUS at 21:45

## 2025-03-21 RX ADMIN — KETOROLAC TROMETHAMINE 30 MILLIGRAM(S): 30 INJECTION, SOLUTION INTRAMUSCULAR; INTRAVENOUS at 09:45

## 2025-03-21 RX ADMIN — KETOROLAC TROMETHAMINE 30 MILLIGRAM(S): 30 INJECTION, SOLUTION INTRAMUSCULAR; INTRAVENOUS at 21:45

## 2025-03-21 RX ADMIN — SCOPOLAMINE 1 PATCH: 1 PATCH, EXTENDED RELEASE TRANSDERMAL at 06:15

## 2025-03-21 RX ADMIN — Medication 975 MILLIGRAM(S): at 06:17

## 2025-03-21 RX ADMIN — Medication 20 MILLIGRAM(S): at 06:16

## 2025-03-21 RX ADMIN — KETOROLAC TROMETHAMINE 30 MILLIGRAM(S): 30 INJECTION, SOLUTION INTRAMUSCULAR; INTRAVENOUS at 09:16

## 2025-03-21 RX ADMIN — SODIUM CHLORIDE 200 MILLILITER(S): 9 INJECTION, SOLUTION INTRAVENOUS at 06:16

## 2025-03-21 RX ADMIN — Medication 30 MILLILITER(S): at 06:16

## 2025-03-21 RX ADMIN — METFORMIN HYDROCHLORIDE 1000 MILLIGRAM(S): 850 TABLET ORAL at 21:45

## 2025-03-21 RX ADMIN — Medication 975 MILLIGRAM(S): at 17:00

## 2025-03-21 RX ADMIN — Medication 975 MILLIGRAM(S): at 12:52

## 2025-03-21 RX ADMIN — OXYTOCIN-SODIUM CHLORIDE 0.9% IV SOLN 30 UNIT/500ML 42 MILLIUNIT(S)/MIN: 30-0.9/5 SOLUTION at 08:39

## 2025-03-21 RX ADMIN — Medication 975 MILLIGRAM(S): at 07:20

## 2025-03-21 RX ADMIN — Medication 2000 MILLIGRAM(S): at 08:07

## 2025-03-21 RX ADMIN — Medication 250 MILLIGRAM(S): at 08:20

## 2025-03-21 RX ADMIN — SCOPOLAMINE 1 PATCH: 1 PATCH, EXTENDED RELEASE TRANSDERMAL at 07:20

## 2025-03-22 LAB
ACANTHOCYTES BLD QL SMEAR: SLIGHT — SIGNIFICANT CHANGE UP
ANISOCYTOSIS BLD QL: SLIGHT — SIGNIFICANT CHANGE UP
BASOPHILS # BLD AUTO: 0.05 K/UL — SIGNIFICANT CHANGE UP (ref 0–0.2)
BASOPHILS # BLD MANUAL: 0.32 K/UL — HIGH (ref 0–0.2)
BASOPHILS NFR BLD AUTO: 0.4 % — SIGNIFICANT CHANGE UP (ref 0–2)
BASOPHILS NFR BLD MANUAL: 2.5 % — HIGH (ref 0–2)
BURR CELLS BLD QL SMEAR: ABNORMAL
EOSINOPHIL # BLD AUTO: 0 K/UL — SIGNIFICANT CHANGE UP (ref 0–0.5)
EOSINOPHIL # BLD MANUAL: 0.1 K/UL — SIGNIFICANT CHANGE UP (ref 0–0.5)
EOSINOPHIL NFR BLD AUTO: 0 % — SIGNIFICANT CHANGE UP (ref 0–6)
EOSINOPHIL NFR BLD MANUAL: 0.8 % — SIGNIFICANT CHANGE UP (ref 0–6)
GIANT PLATELETS BLD QL SMEAR: PRESENT
HCT VFR BLD CALC: 27.4 % — LOW (ref 34.5–45)
HGB BLD-MCNC: 9 G/DL — LOW (ref 11.5–15.5)
HYPOCHROMIA BLD QL: SLIGHT — SIGNIFICANT CHANGE UP
IMM GRANULOCYTES # BLD AUTO: 0.06 K/UL — SIGNIFICANT CHANGE UP (ref 0–0.07)
IMM GRANULOCYTES NFR BLD AUTO: 0.5 % — SIGNIFICANT CHANGE UP (ref 0–0.9)
LYMPHOCYTES # BLD AUTO: 2.34 K/UL — SIGNIFICANT CHANGE UP (ref 1–3.3)
LYMPHOCYTES # BLD MANUAL: 2.19 K/UL — SIGNIFICANT CHANGE UP (ref 1–3.3)
LYMPHOCYTES NFR BLD AUTO: 18.4 % — SIGNIFICANT CHANGE UP (ref 13–44)
LYMPHOCYTES NFR BLD MANUAL: 17.2 % — SIGNIFICANT CHANGE UP (ref 13–44)
MANUAL NEUTROPHIL BANDS #: 0.42 K/UL — SIGNIFICANT CHANGE UP (ref 0–0.84)
MCHC RBC-ENTMCNC: 29.7 PG — SIGNIFICANT CHANGE UP (ref 27–34)
MCHC RBC-ENTMCNC: 32.8 G/DL — SIGNIFICANT CHANGE UP (ref 32–36)
MCV RBC AUTO: 90.4 FL — SIGNIFICANT CHANGE UP (ref 80–100)
MONOCYTES # BLD AUTO: 1.37 K/UL — HIGH (ref 0–0.9)
MONOCYTES # BLD MANUAL: 0.62 K/UL — SIGNIFICANT CHANGE UP (ref 0–0.9)
MONOCYTES NFR BLD AUTO: 10.8 % — SIGNIFICANT CHANGE UP (ref 2–14)
MONOCYTES NFR BLD MANUAL: 4.9 % — SIGNIFICANT CHANGE UP (ref 2–14)
NEUTROPHILS # BLD AUTO: 8.89 K/UL — HIGH (ref 1.8–7.4)
NEUTROPHILS # BLD MANUAL: 9.06 K/UL — HIGH (ref 1.8–7.4)
NEUTROPHILS NFR BLD AUTO: 69.9 % — SIGNIFICANT CHANGE UP (ref 43–77)
NEUTROPHILS NFR BLD MANUAL: 71.3 % — SIGNIFICANT CHANGE UP (ref 43–77)
NEUTS BAND # BLD: 3.3 % — SIGNIFICANT CHANGE UP (ref 0–8)
NEUTS BAND NFR BLD: 3.3 % — SIGNIFICANT CHANGE UP (ref 0–8)
NRBC # BLD AUTO: 0 K/UL — SIGNIFICANT CHANGE UP (ref 0–0)
NRBC # FLD: 0 K/UL — SIGNIFICANT CHANGE UP (ref 0–0)
NRBC BLD AUTO-RTO: 0 /100 WBCS — SIGNIFICANT CHANGE UP (ref 0–0)
PLAT MORPH BLD: NORMAL — SIGNIFICANT CHANGE UP
PLATELET # BLD AUTO: 141 K/UL — LOW (ref 150–400)
PMV BLD: 13.8 FL — HIGH (ref 7–13)
POIKILOCYTOSIS BLD QL AUTO: SLIGHT — SIGNIFICANT CHANGE UP
POLYCHROMASIA BLD QL SMEAR: SLIGHT — SIGNIFICANT CHANGE UP
RBC # BLD: 3.03 M/UL — LOW (ref 3.8–5.2)
RBC # FLD: 13.4 % — SIGNIFICANT CHANGE UP (ref 10.3–14.5)
RBC BLD AUTO: ABNORMAL
WBC # BLD: 12.71 K/UL — HIGH (ref 3.8–10.5)
WBC # FLD AUTO: 12.71 K/UL — HIGH (ref 3.8–10.5)

## 2025-03-22 RX ORDER — SODIUM CHLORIDE 9 G/1000ML
500 INJECTION, SOLUTION INTRAVENOUS ONCE
Refills: 0 | Status: COMPLETED | OUTPATIENT
Start: 2025-03-22 | End: 2025-03-22

## 2025-03-22 RX ORDER — KETOROLAC TROMETHAMINE 30 MG/ML
30 INJECTION, SOLUTION INTRAMUSCULAR; INTRAVENOUS ONCE
Refills: 0 | Status: DISCONTINUED | OUTPATIENT
Start: 2025-03-22 | End: 2025-03-22

## 2025-03-22 RX ORDER — ACETAMINOPHEN 500 MG/5ML
3 LIQUID (ML) ORAL
Qty: 60 | Refills: 0
Start: 2025-03-22 | End: 2025-03-26

## 2025-03-22 RX ADMIN — Medication 975 MILLIGRAM(S): at 13:01

## 2025-03-22 RX ADMIN — Medication 975 MILLIGRAM(S): at 22:18

## 2025-03-22 RX ADMIN — Medication 975 MILLIGRAM(S): at 00:30

## 2025-03-22 RX ADMIN — ENOXAPARIN SODIUM 60 MILLIGRAM(S): 100 INJECTION SUBCUTANEOUS at 21:30

## 2025-03-22 RX ADMIN — KETOROLAC TROMETHAMINE 30 MILLIGRAM(S): 30 INJECTION, SOLUTION INTRAMUSCULAR; INTRAVENOUS at 02:39

## 2025-03-22 RX ADMIN — METFORMIN HYDROCHLORIDE 1000 MILLIGRAM(S): 850 TABLET ORAL at 21:30

## 2025-03-22 RX ADMIN — KETOROLAC TROMETHAMINE 30 MILLIGRAM(S): 30 INJECTION, SOLUTION INTRAMUSCULAR; INTRAVENOUS at 08:44

## 2025-03-22 RX ADMIN — SODIUM CHLORIDE 1000 MILLILITER(S): 9 INJECTION, SOLUTION INTRAVENOUS at 00:15

## 2025-03-22 RX ADMIN — Medication 975 MILLIGRAM(S): at 00:02

## 2025-03-22 RX ADMIN — Medication 975 MILLIGRAM(S): at 05:49

## 2025-03-22 RX ADMIN — Medication 75 MICROGRAM(S): at 05:36

## 2025-03-22 RX ADMIN — Medication 40 MILLIGRAM(S): at 08:44

## 2025-03-22 RX ADMIN — KETOROLAC TROMETHAMINE 30 MILLIGRAM(S): 30 INJECTION, SOLUTION INTRAMUSCULAR; INTRAVENOUS at 02:30

## 2025-03-22 RX ADMIN — Medication 975 MILLIGRAM(S): at 05:35

## 2025-03-22 RX ADMIN — Medication 975 MILLIGRAM(S): at 17:28

## 2025-03-23 VITALS
TEMPERATURE: 99 F | SYSTOLIC BLOOD PRESSURE: 120 MMHG | OXYGEN SATURATION: 97 % | RESPIRATION RATE: 18 BRPM | HEART RATE: 64 BPM | DIASTOLIC BLOOD PRESSURE: 78 MMHG

## 2025-03-23 PROCEDURE — 85025 COMPLETE CBC W/AUTO DIFF WBC: CPT

## 2025-03-23 PROCEDURE — 86900 BLOOD TYPING SEROLOGIC ABO: CPT

## 2025-03-23 PROCEDURE — 59050 FETAL MONITOR W/REPORT: CPT

## 2025-03-23 PROCEDURE — 86780 TREPONEMA PALLIDUM: CPT

## 2025-03-23 PROCEDURE — 86901 BLOOD TYPING SEROLOGIC RH(D): CPT

## 2025-03-23 PROCEDURE — 36415 COLL VENOUS BLD VENIPUNCTURE: CPT

## 2025-03-23 PROCEDURE — 86850 RBC ANTIBODY SCREEN: CPT

## 2025-03-23 RX ADMIN — Medication 975 MILLIGRAM(S): at 05:17

## 2025-03-23 RX ADMIN — Medication 75 MICROGRAM(S): at 05:18

## 2025-03-23 RX ADMIN — Medication 40 MILLIGRAM(S): at 06:25

## 2025-03-24 ENCOUNTER — NON-APPOINTMENT (OUTPATIENT)
Age: 30
End: 2025-03-24

## 2025-03-24 DIAGNOSIS — Z3A.36 36 WEEKS GESTATION OF PREGNANCY: ICD-10-CM

## 2025-03-24 DIAGNOSIS — Z3A.38 38 WEEKS GESTATION OF PREGNANCY: ICD-10-CM

## 2025-03-24 DIAGNOSIS — Z3A.37 37 WEEKS GESTATION OF PREGNANCY: ICD-10-CM

## 2025-03-24 DIAGNOSIS — Z34.93 ENCOUNTER FOR SUPERVISION OF NORMAL PREGNANCY, UNSPECIFIED, THIRD TRIMESTER: ICD-10-CM

## 2025-03-26 ENCOUNTER — APPOINTMENT (OUTPATIENT)
Dept: ANTEPARTUM | Facility: CLINIC | Age: 30
End: 2025-03-26

## 2025-04-01 ENCOUNTER — APPOINTMENT (OUTPATIENT)
Dept: OBGYN | Facility: CLINIC | Age: 30
End: 2025-04-01

## 2025-04-01 ENCOUNTER — APPOINTMENT (OUTPATIENT)
Dept: OBGYN | Facility: CLINIC | Age: 30
End: 2025-04-01
Payer: MEDICAID

## 2025-04-01 VITALS
BODY MASS INDEX: 34.38 KG/M2 | HEIGHT: 63 IN | DIASTOLIC BLOOD PRESSURE: 84 MMHG | WEIGHT: 194 LBS | SYSTOLIC BLOOD PRESSURE: 110 MMHG

## 2025-04-01 DIAGNOSIS — Z48.89 ENCOUNTER FOR OTHER SPECIFIED SURGICAL AFTERCARE: ICD-10-CM

## 2025-04-01 DIAGNOSIS — Z98.891 HISTORY OF UTERINE SCAR FROM PREVIOUS SURGERY: ICD-10-CM

## 2025-04-01 PROCEDURE — 0503F POSTPARTUM CARE VISIT: CPT

## 2025-04-24 DIAGNOSIS — Z01.419 ENCOUNTER FOR GYNECOLOGICAL EXAMINATION (GENERAL) (ROUTINE) W/OUT ABNORMAL FINDINGS: ICD-10-CM

## 2025-05-06 ENCOUNTER — APPOINTMENT (OUTPATIENT)
Dept: OBGYN | Facility: CLINIC | Age: 30
End: 2025-05-06
Payer: MEDICAID

## 2025-05-06 VITALS
WEIGHT: 199 LBS | DIASTOLIC BLOOD PRESSURE: 84 MMHG | HEIGHT: 63 IN | SYSTOLIC BLOOD PRESSURE: 120 MMHG | BODY MASS INDEX: 35.26 KG/M2

## 2025-05-06 DIAGNOSIS — Z98.891 HISTORY OF UTERINE SCAR FROM PREVIOUS SURGERY: ICD-10-CM

## 2025-05-06 DIAGNOSIS — Z01.419 ENCOUNTER FOR GYNECOLOGICAL EXAMINATION (GENERAL) (ROUTINE) W/OUT ABNORMAL FINDINGS: ICD-10-CM

## 2025-05-06 PROCEDURE — 99459 PELVIC EXAMINATION: CPT | Mod: NC

## 2025-05-06 PROCEDURE — 0503F POSTPARTUM CARE VISIT: CPT

## 2025-05-06 PROCEDURE — 99395 PREV VISIT EST AGE 18-39: CPT

## 2025-05-06 RX ORDER — ETONOGESTREL AND ETHINYL ESTRADIOL 11.7; 2.7 MG/1; MG/1
0.12-0.015 INSERT, EXTENDED RELEASE VAGINAL
Qty: 2 | Refills: 2 | Status: ACTIVE | COMMUNITY
Start: 2025-05-06 | End: 1900-01-01

## 2025-05-06 NOTE — OB RN TRIAGE NOTE - TERM DELIVERIES, OB PROFILE
Patient ambulating freely in room. POC reviewed with pt; able to verbalize acceptance and understanding. Tolerating regular diet. Voiding spontaneously. Pt states pain goal met with prescribed medications per MD. VSS. Call light in reach. NAD noted. Will continue to monitor.   0

## 2025-07-17 ENCOUNTER — NON-APPOINTMENT (OUTPATIENT)
Age: 30
End: 2025-07-17

## 2025-07-29 ENCOUNTER — RX RENEWAL (OUTPATIENT)
Age: 30
End: 2025-07-29

## 2025-08-29 ENCOUNTER — APPOINTMENT (OUTPATIENT)
Dept: OBGYN | Facility: CLINIC | Age: 30
End: 2025-08-29